# Patient Record
Sex: MALE
[De-identification: names, ages, dates, MRNs, and addresses within clinical notes are randomized per-mention and may not be internally consistent; named-entity substitution may affect disease eponyms.]

---

## 2020-06-26 LAB
BUN BLD-MCNC: 20 MG/DL (ref 7–18)
GLUCOSE SERPLBLD-MCNC: 114 MG/DL (ref 74–106)
HCT VFR BLD CALC: 46.6 % (ref 39.6–49)
INR BLD: 0.92
LYMPHOCYTES # SPEC AUTO: 2.8 K/UL (ref 0.7–4.9)
PMV BLD: 9 FL (ref 7.6–11.3)
POTASSIUM SERPL-SCNC: 3.9 MMOL/L (ref 3.5–5.1)
RBC # BLD: 5.43 M/UL (ref 4.33–5.43)

## 2020-06-26 NOTE — RAD REPORT
EXAM DESCRIPTION:  Griselda Aranda And Aditya (2 Views)6/26/2020 2:08 pm

 

CLINICAL HISTORY:  Preop for cardiac catheterization

 

COMPARISON:  None

 

FINDINGS:   The lungs appear clear of acute infiltrate. The heart is normal size

 

Postsurgical changes involve the chest.

 

IMPRESSION:   No acute abnormalities displayed

## 2020-06-29 ENCOUNTER — HOSPITAL ENCOUNTER (OUTPATIENT)
Dept: HOSPITAL 97 - CCL | Age: 55
Discharge: HOME | End: 2020-06-29
Attending: INTERNAL MEDICINE
Payer: COMMERCIAL

## 2020-06-29 VITALS — DIASTOLIC BLOOD PRESSURE: 73 MMHG | SYSTOLIC BLOOD PRESSURE: 122 MMHG

## 2020-06-29 VITALS — OXYGEN SATURATION: 99 %

## 2020-06-29 VITALS — TEMPERATURE: 97.2 F

## 2020-06-29 DIAGNOSIS — E78.5: ICD-10-CM

## 2020-06-29 DIAGNOSIS — Z95.1: ICD-10-CM

## 2020-06-29 DIAGNOSIS — I25.82: ICD-10-CM

## 2020-06-29 DIAGNOSIS — I25.110: Primary | ICD-10-CM

## 2020-06-29 DIAGNOSIS — I10: ICD-10-CM

## 2020-06-29 DIAGNOSIS — Z11.59: ICD-10-CM

## 2020-06-29 DIAGNOSIS — Z87.891: ICD-10-CM

## 2020-06-29 DIAGNOSIS — Z79.82: ICD-10-CM

## 2020-06-29 DIAGNOSIS — E11.9: ICD-10-CM

## 2020-06-29 PROCEDURE — 36415 COLL VENOUS BLD VENIPUNCTURE: CPT

## 2020-06-29 PROCEDURE — 85610 PROTHROMBIN TIME: CPT

## 2020-06-29 PROCEDURE — 71046 X-RAY EXAM CHEST 2 VIEWS: CPT

## 2020-06-29 PROCEDURE — 80048 BASIC METABOLIC PNL TOTAL CA: CPT

## 2020-06-29 PROCEDURE — 93458 L HRT ARTERY/VENTRICLE ANGIO: CPT

## 2020-06-29 PROCEDURE — 93459 L HRT ART/GRFT ANGIO: CPT

## 2020-06-29 PROCEDURE — 85025 COMPLETE CBC W/AUTO DIFF WBC: CPT

## 2020-06-29 PROCEDURE — 82947 ASSAY GLUCOSE BLOOD QUANT: CPT

## 2020-06-29 PROCEDURE — 85730 THROMBOPLASTIN TIME PARTIAL: CPT

## 2020-06-29 NOTE — XMS REPORT
Continuity of Care Document

                            Created on:2020



Patient:DAYDAY PETER

Sex:Male

:1965

External Reference #:[id number]





Demographics







                          Address                   [address] [place] [address]

 

                          Home Phone                [phone]

 

                          Work Phone                [phone]

 

                          Email Address             [email or web address]

 

                          Preferred Language        English

 

                          Marital Status            Unknown

 

                          Christian Affiliation     Unknown

 

                          Race                      Unknown

 

                          Additional Race(s)        Unavailable

 

                          Ethnic Group              Unknown









Author







                          Organization              [facility]

t

 

                          Address                   [address] [place] [address] [address]



                                                    [place] [address]

 

                          Phone                     [phone]









Support







                Name            Relationship    Address         Phone

 

                ROME        Unavailable     [address]    [phone]



                                                [place] [address] 

 

                ROME        Unavailable     [address] [phone]



                                                [place] [address] 









Care Team Providers







                    Name                Role                Phone

 

                    Unavailable         Unavailable         Unavailable









Payers







           Payer Name Policy Type Policy Number Effective Date Expiration Date S

ource







Problems

This patient has no known problems.



Allergies, Adverse Reactions, Alerts







       Allergy Allergy Status Severity Reaction(s) Onset  Inactive Treating Comm

ents 

Source



       Name   Type                        Date   Date   Clinician        

 

       No Known DA     Active U             2011                      [facility]



       Allergie                                                     Clear



       s                                  00:00:                      [address]







Medications

This patient has no known medications.



Procedures

This patient has no known procedures.



Results







           Test Description Test Time  Test Comments Results    Result Comments 

Source

 

           SURG       2019            ---------------------            



                      12:56:00              ---------------------            



                                            ---------------------            



                                            ---------------------            



                                            --------RUN DATE:            



                                            19              



                                            [facility] - LAB *LIVE*            



                                                        PAGE 1            



                                            RUN TIME: 9966            



                                                                  



                                            Specimen Inquiry            



                                                          RUN            



                                            USER: INTERFACE            



                                                                  



                                                                  



                                                                  



                                            ---------------------            



                                            ---------------------            



                                            ---------------------            



                                            ---------------------            



                                            --------PATIENT:            



                                            DAYDAY PETER            



                                                    ACCT #:            



                                            [id number] LOC:            



                                            L.2S       U #:            



                                            [id number]            



                                                                  



                                                 AGE/SX: 54/M            



                                                ROOM: [facility]            



                                            RE19REG DR:            



                                            Clark Tipton MD            



                                                    :            



                                            61     BED:  1            



                                                    DIS: 19            



                                                                  



                                                                  



                                            STATUS: DIS Александр            



                                            TLOC:                 



                                            ---------------------            



                                            ---------------------            



                                            ---------------------            



                                            ---------------------            



                                            -------- SPEC #:            



                                            PMC:[id number]            



                                            RECD:             



                                              STATUS:  BEBO            



                                                REQ #: [id number]            



                                                                  



                                              MOY:             



                                                [facility] DR:            



                                            Clark Tipton MD            



                                                     ENTERED:            



                                                SP            



                                            TYPE: SURG            



                                            OTHR DR: No Primary            



                                            or Family Physician            



                                                                  



                                                                  



                                                         Self            



                                            Referred              



                                                                  



                                                                  



                                              Issac Bailon MDORDERED:  SURG PATH            



                                            LVL 3                 



                                                                  



                                                                  



                                                   COPIES TO:            



                                            No Primary or Family            



                                            Physician    Self            



                                            Referred              



                                            Clark Tipton MD            



                                             [address] [address]   [address]            



                                            [place] [address] [phone]            



                                            [email or web address]            



                                                Issac Bailon MD            



                                              [address]            



                                            [address]   [place] [address]              



                                            [phone]            



                                            HISTOLOGY:   TISSUE            



                                                   ID    BLK            



                                            PCS ABIGAIL LEV PROCEDURE            



                                                  DISPOSITION            



                                            _______________ ____            



                                            ______ ___ ___ ___            



                                            _______________            



                                            ___________            



                                            GALLBLADDER, NO A            



                                            1-2             1            



                                                                  



                                            PROCEDURES: SURG PATH            



                                            LVL 3 ()            



                                            TISSUES:           A.            



                                            GALLBLADDER, NOS -            



                                            GALLBLADDER            



                                            CLINICAL HISTORY            



                                            ABD PAIN              



                                            CPT CODES    CPT            



                                            CODE(S): 64087      ,            



                                                      ,            



                                             ,           ,            



                                               ,            ,            



                                                                  



                                                      **            



                                            CONTINUED ON NEXT            



                                            PAGE **               



                                            ---------------------            



                                            ---------------------            



                                            ---------------------            



                                            ---------------------            



                                            --------RUN DATE:            



                                            19              



                                            [facility] - LAB *LIVE*            



                                                        PAGE 2            



                                            RUN TIME: 1256            



                                                                  



                                            Specimen Inquiry            



                                                          RUN            



                                            USER: INTERFACE            



                                                                  



                                                                  



                                                                  



                                            ---------------------            



                                            ---------------------            



                                            ---------------------            



                                            ---------------------            



                                            --------SPEC #:            



                                            [facility]:[id number]            



                                            PATIENT:              



                                            DAYDAY PETER            



                                                                  



                                            #[id number]            



                                            (Continued)----------            



                                            ---------------------            



                                            ---------------------            



                                            ---------------------            



                                            -------------------            



                                                   FINAL            



                                            DIAGNOSIS             



                                            Gallbladder,            



                                            laparoscopic            



                                            cholecystectomy:            



                                             MILD CHRONIC            



                                            CHOLECYSTITIS WITH            



                                            CHOLELITHIASIS            



                                            GROSS DESCRIPTION            



                                            Gallbladder.            



                                            Received in formalin            



                                            is a collapsed            



                                            gallbladder, 8.5 x            



                                            2.7 x 2.0   cm with a            



                                            wall, 0.3 cm in            



                                            average thickness and            



                                            a defect in the body            



                                            extending   to the            



                                            gallbladder neck, 3.0            



                                            cm.  The mucosa is            



                                            brown-green, smooth            



                                            and   velvety.  The            



                                            lumen contains scanty            



                                            bile and numerous            



                                            black friable            



                                            calculi,   2.0 x 1.7            



                                            x 0.3 cm in            



                                            aggregate.  Five            



                                            additional similar            



                                            calculi are present            



                                            in the container            



                                            measuring 1.0 x 0.7 x            



                                            0.3 cm in aggregate.            



                                            Representative            



                                            section submitted as            



                                            A.  /ba/pdb            



                                            Grossing performed at            



                                            [facility], [address]            



                                            [facility], [address],            



                                            [place] [address].            



                                             Medical Director:            



                                            Champ Dooley M.D.            



                                               MICROSCOPIC            



                                            DESCRIPTION            



                                            Gallbladder.            



                                            Sections demonstrate            



                                            gallbladder wall with            



                                            glandular mucosa.            



                                            Focal dilated            



                                            Rokitansky-Aschoff            



                                            sinuses are            



                                            identified.  There is            



                                            mild patchy   chronic            



                                            inflammation in the            



                                            wall of the            



                                            gallbladder.  There            



                                            is no evidence of            



                                            dysplasia or            



                                            malignancy.            



                                            /cm------------------            



                                            ---------------------            



                                            ---------------------            



                                            ---------------------            



                                            ----------- Signed            



                                            SIGNATURE ON FILE            



                                                                  



                                            Marry Freeman            



                                            19 1256            



                                            ---------------------            



                                            ---------------------            



                                            ---------------------            



                                            ---------------------            



                                            --------              



                                                                  



                                                               **            



                                            END OF REPORT **            









                    COMPREHENSIVE METABOLIC PANEL 2019 18:11:00 









                      Test Item  Value      Reference Range Interpretation Comme

nts









             SODIUM (test code = NA) 137 mmol/L   134-147      N            

 

             POTASSIUM (test code = K) 4.4 mmol/L   3.4-5.0      N            

 

             CHLORIDE (test code = CL) 107 mmol/L   100-108      N            

 

             CARBON DIOXIDE (test code = CO2) 23 mmol/L    21-32        N       

     

 

             ANION GAP (test code = GAP) 7.0 GAP calc 4.0-15.0     N            

 

             GLUCOSE (test code = GLU) 239 MG/DL           H            

 

             BLOOD UREA NITROGEN (test code = BUN) 10 MG/DL     7-18         N  

          

 

             GLOMERULAR FILTRATION RATE (test code = GFR) >=60 max estimate estG

FR >60                       

 

             CREATININE (test code = CREAT) 1.3 MG/DL    0.8-1.3      N         

   

 

             TOTAL PROTEIN (test code = PROT) 7.1 G/DL     6.4-8.2      N       

     

 

             ALBUMIN (test code = ALB) 3.2 G/DL     3.4-5.0      L            

 

             GLOBULIN (test code = GLOB) 3.9 GM/dL                              

 

             ALBUMIN/GLOBULIN RATIO (test code = A/G) 0.8 RATIO    1.2-2.2      

L            

 

             CALCIUM (test code = CA) 7.9 MG/DL    8.5-10.1     L            

 

             BILIRUBIN TOTAL (test code = BILT) 1.20 MG/DL   0.2-1.2      N     

       

 

             SGOT/AST (test code = AST) 91 Unit/L    15-37        H            

 

             SGPT/ALT (test code = ALT) 139 Unit/L   12-78        H            

 

             ALKALINE PHOSPHATASE TOTAL (test code = ALKP) 81 Unit/L      

     N            



GLUCOSE BEDSIDE [id number] 16:44:00





             Test Item    Value        Reference Range Interpretation Comments

 

             GLUCOSE BEDSIDE TESTING (test code 147 mg/dL           H     

       



             = GLUBED)                                           



CBC W/AUTO [id number] 16:07:00





             Test Item    Value        Reference Range Interpretation Comments

 

             WHITE BLOOD CELL (test code = 8.3 K/mm3    3.5-11.0     N          

  



             WBC)                                                

 

             RED BLOOD CELL (test code = RBC) 4.92 M/mm3   4.70-6.10    N       

     

 

             HEMOGLOBIN (test code = HGB) 14.0 G/DL    12.3-15.9    N           

 

 

             HEMATOCRIT (test code = HCT) 41.5 %       35.8-46.7    N           

 

 

             MEAN CELL VOLUME (test code = 84.3 Fl      86.3-98.9    L          

  



             MCV)                                                

 

             MEAN CELL HGB (test code = MCH) 28.5 pg      28.9-34.4    L        

    

 

             MEAN CELL HGB CONCETRATION (test 33.7 G/DL    32.1-34.5    N       

     



             code = MCHC)                                        

 

             RED CELL DISTRIBUTION WIDTH (test 13.2 SD      11.5-14.5    N      

      



             code = RDW)                                         

 

             PLATELET COUNT (test code = PLT) 253.0 K/mm3  150-450      N       

     

 

             MEAN PLATELET VOLUME (test code = 10.30 fL     7.0-9.6      H      

      



             MPV)                                                

 

             NEUTROPHIL % (test code = NT%) 67.6 %       40-76                  

   

 

             LYMPHOCYTE % (test code = LY%) 21.1 %       20.5-51.1    N         

   

 

             MONOCYTE % (test code = MO%) 8.0 %        1.7-9.3      N           

 

 

             EOSINOPHIL % (test code = EO%) 2.7 %        0.0-6.0      N         

   

 

             BASOPHIL % (test code = BA%) 0.6 %        0.0-2.0      N           

 

 

             NEUTROPHIL # (test code = NT#) 5.59 K/mm3   1.8-7.6      N         

   

 

             LYMPHOCYTE # (test code = LY#) 1.7 K/mm3    0.6-3.0      N         

   

 

             MONOCYTE # (test code = MO#) 0.7 K/mm3    0.2-1.5      N           

 

 

             EOSINOPHIL # (test code = EO#) 0.2 K/mm3    0.0-0.4      N         

   

 

             BASOPHIL # (test code = BA#) 0.1 K/mm3    0.0-0.2      N           

 

 

             MANUAL DIFF REQUIRED (test code = NO DIFF/SCN  CRITERIA            

      



             MDIFF)                                              



GLUCOSE BEDSIDE [id number] 13:00:00





             Test Item    Value        Reference Range Interpretation Comments

 

             GLUCOSE BEDSIDE TESTING (test code 143 mg/dL           H     

       



             = GLUBED)                                           



GLUCOSE BEDSIDE [id number] 10:06:00





             Test Item    Value        Reference Range Interpretation Comments

 

             GLUCOSE BEDSIDE TESTING (test code 113 mg/dL           H     

       



             = GLUBED)                                           



GLUCOSE BEDSIDE [id number] 07:51:00





             Test Item    Value        Reference Range Interpretation Comments

 

             GLUCOSE BEDSIDE TESTING (test code 131 mg/dL           H     

       



             = GLUBED)                                           



COMPREHENSIVE METABOLIC [id number] 07:11:00





             Test Item    Value        Reference Range Interpretation Comments

 

             SODIUM (test code = NA) 141 mmol/L   134-147      N            

 

             POTASSIUM (test code = 3.9 mmol/L   3.4-5.0      N            



             K)                                                  

 

             CHLORIDE (test code = 110 mmol/L   100-108      H            



             CL)                                                 

 

             CARBON DIOXIDE (test 25 mmol/L    21-32        N            



             code = CO2)                                         

 

             ANION GAP (test code = 6.0 GAP calc 4.0-15.0     N            



             GAP)                                                

 

             GLUCOSE (test code = 123 MG/DL           H            



             GLU)                                                

 

             BLOOD UREA NITROGEN 10 MG/DL     7-18         N            



             (test code = BUN)                                        

 

             GLOMERULAR FILTRATION >=60 max estimate >60                       



             RATE (test code = GFR) estGFR                                 

 

             CREATININE (test code = 1.3 MG/DL    0.8-1.3      N            



             CREAT)                                              

 

             TOTAL PROTEIN (test code 7.3 G/DL     6.4-8.2      N            



             = PROT)                                             

 

             ALBUMIN (test code = 3.5 G/DL     3.4-5.0      N            



             ALB)                                                

 

             GLOBULIN (test code = 3.8 GM/dL                              



             GLOB)                                               

 

             ALBUMIN/GLOBULIN RATIO 0.9 RATIO    1.2-2.2      L            



             (test code = A/G)                                        

 

             CALCIUM (test code = CA) 8.3 MG/DL    8.5-10.1     L            

 

             BILIRUBIN TOTAL (test 1.40 MG/DL   0.2-1.2      H            



             code = BILT)                                        

 

             SGOT/AST (test code = 102 Unit/L   15-37        H            



             AST)                                                

 

             SGPT/ALT (test code = 151 Unit/L   12-78        H            



             ALT)                                                

 

             ALKALINE PHOSPHATASE 99 Unit/L           N            



             TOTAL (test code = ALKP)                                        



COMPREHENSIVE METABOLIC [id number] 06:59:00





             Test Item    Value        Reference Range Interpretation Comments

 

             SODIUM (test code = NA) 141 mmol/L   134-147      N            

 

             POTASSIUM (test code = K) 3.9 mmol/L   3.4-5.0      N            

 

             CHLORIDE (test code = CL) 110 mmol/L   100-108      H            

 

             CARBON DIOXIDE (test code = CO2) 25 mmol/L    21-32        N       

     

 

             ANION GAP (test code = GAP) 6.0 GAP calc 4.0-15.0     N            

 

             GLUCOSE (test code = GLU) 123 MG/DL           H            

 

             BLOOD UREA NITROGEN (test code = 10 MG/DL     7-18         N       

     



             BUN)                                                

 

             GLOMERULAR FILTRATION RATE (test  estGFR      >60                  

     



             code = GFR)                                         

 

             CREATININE (test code = CREAT)  MG/DL       0.8-1.3                

   

 

             TOTAL PROTEIN (test code = PROT)  G/DL        6.4-8.2              

     

 

             ALBUMIN (test code = ALB)  G/DL        3.4-5.0                   

 

             GLOBULIN (test code = GLOB)  GM/dL                                 

 

             ALBUMIN/GLOBULIN RATIO (test  RATIO       1.2-2.2                  

 



             code = A/G)                                         

 

             CALCIUM (test code = CA) 8.3 MG/DL    8.5-10.1     L            

 

             BILIRUBIN TOTAL (test code =  MG/DL       0.2-1.2                  

 



             BILT)                                               

 

             SGOT/AST (test code = AST)  Unit/L      15-37                     

 

             SGPT/ALT (test code = ALT)  Unit/L      12-78                     

 

             ALKALINE PHOSPHATASE TOTAL (test  Unit/L                     

     



             code = ALKP)                                        



CBC W/AUTO [id number] 06:53:00





             Test Item    Value        Reference Range Interpretation Comments

 

             WHITE BLOOD CELL (test code = 8.3 K/mm3    3.5-11.0     N          

  



             WBC)                                                

 

             RED BLOOD CELL (test code = RBC) 4.92 M/mm3   4.70-6.10    N       

     

 

             HEMOGLOBIN (test code = HGB) 14.0 G/DL    12.3-15.9    N           

 

 

             HEMATOCRIT (test code = HCT) 41.5 %       35.8-46.7    N           

 

 

             MEAN CELL VOLUME (test code = 84.3 Fl      86.3-98.9    L          

  



             MCV)                                                

 

             MEAN CELL HGB (test code = MCH) 28.5 pg      28.9-34.4    L        

    

 

             MEAN CELL HGB CONCETRATION (test 33.7 G/DL    32.1-34.5    N       

     



             code = MCHC)                                        

 

             RED CELL DISTRIBUTION WIDTH (test 13.2 SD      11.5-14.5    N      

      



             code = RDW)                                         

 

             PLATELET COUNT (test code = PLT) 253.0 K/mm3  150-450      N       

     

 

             MEAN PLATELET VOLUME (test code = 10.30 fL     7.0-9.6      H      

      



             MPV)                                                

 

             NEUTROPHIL % (test code = NT%) 67.6 %       40-76                  

   

 

             LYMPHOCYTE % (test code = LY%) 21.1 %       20.5-51.1    N         

   

 

             MONOCYTE % (test code = MO%) 8.0 %        1.7-9.3      N           

 

 

             EOSINOPHIL % (test code = EO%) 2.7 %        0.0-6.0      N         

   

 

             BASOPHIL % (test code = BA%) 0.6 %        0.0-2.0      N           

 

 

             NEUTROPHIL # (test code = NT#) 5.59 K/mm3   1.8-7.6      N         

   

 

             LYMPHOCYTE # (test code = LY#) 1.7 K/mm3    0.6-3.0      N         

   

 

             MONOCYTE # (test code = MO#) 0.7 K/mm3    0.2-1.5      N           

 

 

             EOSINOPHIL # (test code = EO#) 0.2 K/mm3    0.0-0.4      N         

   

 

             BASOPHIL # (test code = BA#) 0.1 K/mm3    0.0-0.2      N           

 

 

             MANUAL DIFF REQUIRED (test code =  DIFF/SCN    CRITERIA            

      



             MDIFF)                                              



- XR CHEST 1 -92-30 23:12:00 Name: DAYDAY PETER                [facility]                      : 1965 Age/S: 54  / M         [id number] 
[employer] [place]              Unit #: [id number]     Loc:               [place]
[address]                Phys: Clark Tipton MD                                 
              Acct: [id number]  Dis Date:               Status: ADM IN        
                         PHONE #: [phone]     Exam Date: 2019  2300  
                  FAX #:                  Reason: FOR SURGERY IN AM.            
                    EXAMS:                                               CPT:   
     [id number] XR CHEST 1 V                               [id number]             
Fluoro Time:   DAP (Gy m2):          Air Kerma (mGy):                      
HISTORY: Chest pain.       Location: C3        COMPARISON:2012              
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       There
is mild cardiomegaly.  No vascular congestion.        The lungs are clear of f
ocal consolidation. No effusion,        pneumothorax, or acute osseous 
abnormality.       IMPRESSION:                   1. Heart size is upper normal. 
No focal consolidation.           ** Electronically Signed by TIMOTEO Reyes **            **             on 2019 at 2312            **           
          Reported and signed by: Jaison Reyes M.D.              CC: Clark Tipton MD                                                      PAGE  1         
             Signed Report                             Name: DAYDAY PETER Denair    : 1965 Age/S: 54  / M         [id number] 
[address] [place]              Unit #: [id number]     Loc:               [place]
[address]                Phys: Clark Tipton MD                       Acct: 
[id number]  Dis Date:               Status: ADM IN              PHONE #: 
[phone]     Exam Date: 2019  2300                     FAX #:         
 Reason: FOR SURGERY IN AM.                                 EXAMS:              
       CPT:           [id number] XR CHEST 1 V                               [address]
      Fluoro Time:            DAP (Gy m2):          Air Kerma (mGy):         
&lt;Continued&gt; Technologist: Kelli Beck RT(R)(CT)                      
      Trnscb Date/Time: 2019 (2312) t.SDR.RXC2                       Orig 
Print D/T: S: 2019 (2315)            PAGE  2                       Signed 
Report- CT ABD PELVIS [id number] 19:50:00  Name: DAYDAY PETER Denair                      : 1965 Age/S: 54  / M       
 [id number] [address] [place]              Unit #: [id number]     Loc:               
[place] [address]                Phys: Clark Tipton MD                    
                           Acct: [id number]  Dis Date:               Status: 
ADM IN                                  PHONE #: [phone]     Exam Date: 
2019                     FAX #:                   Reason: Abd pain  
                                         EXAMS:                                 
             CPT:           [id number] CT ABD PELVIS W/CONT                      
[address]                    CT ABDOMEN AND PELVIS WITH IV CONTRAST               
HISTORY:  Abd pain                COMPARISON: MRI abdomen 12.               
TECHNIQUE: Axial CT images of the abdomen and pelvis were obtained       with 
coronal and/or sagittal reformatted views.  Automated exposure       control, 
iterative reconstruction technique, and/or adjustment of mA       and/or kV 
according to patient's size was utilized for radiation dose       reduction.    
    IV CONTRAST:  100 ml Isovue-300.       PO CONTRAST: None.       Location: 
B2.               FINDINGS:               Mild atelectasis present in both lung 
bases.  The heart size is       normal.  Coronary artery calcifications.  
Epicardial pacemaker leads.        Sternotomy wires.               The 
gallbladder is distended.  There are few small gallstones in theneck of the 
gallbladder.  There may be a small amount of       pericholecystic fluid and 
minimal inflammatory changes.  Mild diffuse       low attenuation seen 
throughout the liver suggestive of cyst    steatosis.  No focal hepatic lesion. 
Spleen, pancreas and adrenal       glands are unremarkable.               Both 
kidneys are similar in size, shape and enhancement without       evidence of 
hydronephrosis.  There are at least 3 nonobstructing right       renal stones 
measuring up to 3 mm.  No definite left renal stone.               Urinary 
bladder is partially distended without wall thickening.  The       prostate is 
normal in size.               No free air, free fluid or evidence of a bowel 
obstruction.  Normal       appendix.  No bowel wall thickening.               
The aorta is normal in caliber with mild to moderate atherosclerotic       
disease.  No abdominal or pelvic adenopathy.            Mild lumbar spondylosis.
                IMPRESSION:               PAGE  1        Signed Report          
         (CONTINUED)   Name: NIESHADORIS ESPARZADEANA DERICK               [facility]    
                 : 1965 Age/S: 54  / M         [id number] [employer] [place]      
       Unit #: [id number]     Loc:               [place] [address]             
  Phys: Clark Tipton MD                                                
Acct: [id number]  Dis Date:               Status: ADM IN                      
           PHONE #: [phone]     Exam Date: 2019            FAX 
#:                   Reason: Abd pain                                           
EXAMS:                                               CPT:           [id number] CT
ABD PELVIS W/CONT                [address]               &lt;Continued&gt;          
Cholelithiasis.  Minimal inflammatorychanges and probable         
pericholecystic fluid concerning for acute cholecystitis.  A right   upper 
quadrant ultrasound may be helpful for further assessment.                   
Normal appendix.                   Multiple nonobstructing right renal stones.  
               ** Electronically Signed by TIMOTEO Angelo **          
**               on 2019 at 1950              **                   
Reported and signed by: Darshan Angelo M.D.                               
CC: Clark Tipton MD                                    Technologist:Edmundo Nava, RT(R)(CT); ..  CTDI:        DLP:     Trnscb Date/Time: 2019 
() t.SDR.SP17                       Orig Print D/T: S: 2019 ()    
PAGE  2                       Signed ReportLACTIC [id number] 18:23:00





             Test Item    Value        Reference Range Interpretation Comments

 

             LACTIC ACID (test code = LACT) 1.4 mmol/L   0.4-2.0      N         

   



COMPREHENSIVE METABOLIC [id number] 18:09:00





             Test Item    Value        Reference Range Interpretation Comments

 

             SODIUM (test code = NA) 141 mmol/L   134-147      N            

 

             POTASSIUM (test code = 3.9 mmol/L   3.4-5.0      N            



             K)                                                  

 

             CHLORIDE (test code = 110 mmol/L   100-108      H            



             CL)                                                 

 

             CARBON DIOXIDE (test 26 mmol/L    21-32        N            



             code = CO2)                                         

 

             ANION GAP (test code = 5.0 GAP calc 4.0-15.0     N            



             GAP)                                                

 

             GLUCOSE (test code = 116 MG/DL           H            



             GLU)                                                

 

             BLOOD UREA NITROGEN 13 MG/DL     7-18         N            



             (test code = BUN)                                        

 

             GLOMERULAR FILTRATION >=60 max estimate >60                       



             RATE (test code = GFR) estGFR                                 

 

             CREATININE (test code = 1.3 MG/DL    0.8-1.3      N            



             CREAT)                                              

 

             TOTAL PROTEIN (test code 7.4 G/DL     6.4-8.2      N            



             = PROT)                                             

 

             ALBUMIN (test code = 3.6 G/DL     3.4-5.0      N            



             ALB)                                                

 

             GLOBULIN (test code = 3.8 GM/dL                              



             GLOB)                                               

 

             ALBUMIN/GLOBULIN RATIO 1.0 RATIO    1.2-2.2      L            



             (test code = A/G)                                        

 

             CALCIUM (test code = CA) 8.4 MG/DL    8.5-10.1     L            

 

             BILIRUBIN TOTAL (test 1.70 MG/DL   0.2-1.2      H            



             code = BILT)                                        

 

             SGOT/AST (test code = 130 Unit/L   15-37        H            



             AST)                                                

 

             SGPT/ALT (test code = 102 Unit/L   12-78        H            



             ALT)                                                

 

             ALKALINE PHOSPHATASE 76 Unit/L           N            



             TOTAL (test code = ALKP)                                        



LIPID PROFILE (CORONARY RISK)2019 18:09:00





             Test Item    Value        Reference Range Interpretation Comments

 

             TRIGLYCERIDES (test code = TRIG) 93 MG/DL     0-150        N       

     

 

             CHOLESTEROL (test code = CHOL) 140 MG/DL    133-200      N         

   

 

             CHOLESTEROL/HDL RATIO (test code = 2.50 RATIO   >0                 

       



             CHOLHDL)                                            

 

             HDL CHOLESTEROL (test code = HDL) 56 MG/DL     40-59        N      

      

 

             NON-HDL CHOLESTEROL (test code = 84 mg/dL     <130                 

     



             NHDL)                                               

 

             LIPOPROTEIN LDL (test code = LDL) 69 MG/DL     0-129        N      

      

 

             LDL/HDL (test code = LDL/HDL) 1.23 Ratio   1.48-3.22 Avg L         

   



[id number] 18:09:00





             Test Item    Value        Reference Range Interpretation Comments

 

             PHOSPHOROUS (test code = PHOS) 2.5 MG/DL    2.5-4.9      N         

   



RULE OUT MI [id number] 18:09:00





             Test Item    Value        Reference Range Interpretation Comments

 

             CREATINE KINASE 179 Unit/L          N            



             (CK) (test code =                                        



             CK)                                                 

 

             TROPONIN-I (test < 0.015 NG/ML 0.000-0.045  N            Negative: 

</= 0.045



             code = TROPI)                                        Positive: >/= 

0.046



                                                                 Correlation wit

h



                                                                 serial results,

 other



                                                                 cardiac markers

, and



                                                                 clinical findin

gs is



                                                                 necessary to de

termine



                                                                 the clinical



                                                                 significance of

 this



                                                                 result. Quantit

ative



                                                                 results using



                                                                 different



                                                                 methodologies s

hould



                                                                 not be compared

 to one



                                                                 another as nume

rical



                                                                 results may promise

yby



                                                                 method.



[id number] 18:09:00





             Test Item    Value        Reference Range Interpretation Comments

 

             LIPASE (test code = LIP) 457 Unit/L   114-286      H            



[id number] 18:09:00





             Test Item    Value        Reference Range Interpretation Comments

 

             MAGNESIUM (test code = MAG) 2.0 MG/DL    1.8-2.4      N            



COMPREHENSIVE METABOLIC [id number] 18:05:00





             Test Item    Value        Reference Range Interpretation Comments

 

             SODIUM (test code = NA) 141 mmol/L   134-147      N            

 

             POTASSIUM (test code = K) 3.9 mmol/L   3.4-5.0      N            

 

             CHLORIDE (test code = CL) 110 mmol/L   100-108      H            

 

             CARBON DIOXIDE (test code = CO2) 26 mmol/L    21-32        N       

     

 

             ANION GAP (test code = GAP) 5.0 GAP calc 4.0-15.0     N            

 

             GLUCOSE (test code = GLU) 116 MG/DL           H            

 

             BLOOD UREA NITROGEN (test code = 13 MG/DL     7-18         N       

     



             BUN)                                                

 

             GLOMERULAR FILTRATION RATE (test  estGFR      >60                  

     



             code = GFR)                                         

 

             CREATININE (test code = CREAT)  MG/DL       0.8-1.3                

   

 

             TOTAL PROTEIN (test code = PROT)  G/DL        6.4-8.2              

     

 

             ALBUMIN (test code = ALB)  G/DL        3.4-5.0                   

 

             GLOBULIN (test code = GLOB)  GM/dL                                 

 

             ALBUMIN/GLOBULIN RATIO (test  RATIO       1.2-2.2                  

 



             code = A/G)                                         

 

             CALCIUM (test code = CA) 8.4 MG/DL    8.5-10.1     L            

 

             BILIRUBIN TOTAL (test code =  MG/DL       0.2-1.2                  

 



             BILT)                                               

 

             SGOT/AST (test code = AST)  Unit/L      15-37                     

 

             SGPT/ALT (test code = ALT)  Unit/L      12-78                     

 

             ALKALINE PHOSPHATASE TOTAL (test  Unit/L                     

     



             code = ALKP)                                        



LIPID PROFILE (CORONARY RISK)2019 18:05:00





             Test Item    Value        Reference Range Interpretation Comments

 

             TRIGLYCERIDES (test code = TRIG)  MG/DL       0-150                

     

 

             CHOLESTEROL (test code = CHOL)  MG/DL       133-200                

   

 

             CHOLESTEROL/HDL RATIO (test code =  RATIO       >0                 

       



             CHOLHDL)                                            

 

             HDL CHOLESTEROL (test code = HDL)  MG/DL       40-59               

      

 

             NON-HDL CHOLESTEROL (test code = NHDL)  mg/dL       <130           

           

 

             LIPOPROTEIN LDL (test code = LDL)  MG/DL       0-129               

      

 

             LDL/HDL (test code = LDL/HDL)  Ratio       1.48-3.22 Avg           

   



[id number] 18:05:00





             Test Item    Value        Reference Range Interpretation Comments

 

             PHOSPHOROUS (test code = PHOS)  MG/DL       2.5-4.9                

   



RULE OUT MI [id number] 18:05:00





             Test Item    Value        Reference Range Interpretation Comments

 

             CREATINE KINASE (CK) (test code = CK)  Unit/L                

          

 

             TROPONIN-I (test code = TROPI)  NG/ML       0.000-0.045            

   



[id number] 18:05:00





             Test Item    Value        Reference Range Interpretation Comments

 

             LIPASE (test code = LIP)  Unit/L      114-286                   



[id number] 18:05:00





             Test Item    Value        Reference Range Interpretation Comments

 

             MAGNESIUM (test code = MAG)  MG/DL       1.8-2.4                   



PROTHROMBIN [id number] 17:54:00





             Test Item    Value        Reference Range Interpretation Comments

 

             PT PATIENT (test code = PTP) 11.2 SECONDS 9.3-12.9     N           

 

 

             INTERNATIONAL NORMAL RATIO 0.97 INR Unit 0.8-1.2      N            



             (test code = INR)                                        



CBC W/AUTO [id number] 17:48:00





             Test Item    Value        Reference Range Interpretation Comments

 

             WHITE BLOOD CELL (test code = 9.0 K/mm3    3.5-11.0     N          

  



             WBC)                                                

 

             RED BLOOD CELL (test code = RBC) 5.10 M/mm3   4.70-6.10    N       

     

 

             HEMOGLOBIN (test code = HGB) 14.5 G/DL    12.3-15.9    N           

 

 

             HEMATOCRIT (test code = HCT) 43.4 %       35.8-46.7    N           

 

 

             MEAN CELL VOLUME (test code = 85.1 Fl      86.3-98.9    L          

  



             MCV)                                                

 

             MEAN CELL HGB (test code = MCH) 28.4 pg      28.9-34.4    L        

    

 

             MEAN CELL HGB CONCETRATION (test 33.4 G/DL    32.1-34.5    N       

     



             code = MCHC)                                        

 

             RED CELL DISTRIBUTION WIDTH (test 13.0 SD      11.5-14.5    N      

      



             code = RDW)                                         

 

             PLATELET COUNT (test code = PLT) 274.0 K/mm3  150-450      N       

     

 

             MEAN PLATELET VOLUME (test code = 10.50 fL     7.0-9.6      H      

      



             MPV)                                                

 

             NEUTROPHIL % (test code = NT%) 75.7 %       40-76        N         

   

 

             LYMPHOCYTE % (test code = LY%) 16.5 %       20.5-51.1    L         

   

 

             MONOCYTE % (test code = MO%) 6.9 %        1.7-9.3      N           

 

 

             EOSINOPHIL % (test code = EO%) 0.6 %        0.0-6.0      N         

   

 

             BASOPHIL % (test code = BA%) 0.3 %        0.0-2.0      N           

 

 

             NEUTROPHIL # (test code = NT#) 6.80 K/mm3   1.8-7.6      N         

   

 

             LYMPHOCYTE # (test code = LY#) 1.5 K/mm3    0.6-3.0      N         

   

 

             MONOCYTE # (test code = MO#) 0.6 K/mm3    0.2-1.5      N           

 

 

             EOSINOPHIL # (test code = EO#) 0.1 K/mm3    0.0-0.4      N         

   

 

             BASOPHIL # (test code = BA#) 0.0 K/mm3    0.0-0.2      N           

 

 

             MANUAL DIFF REQUIRED (test code = NO DIFF/SCN  CRITERIA            

      



             MDIFF)

## 2020-06-30 NOTE — OP
Date of Procedure:  06/29/2020



Surgeon:  MARY CAMPOS



Procedure Performed:  

1.Selective coronary angiogram.

2.Bypass graft study.

3.Left heart catheterization.



Access:  Right femoral artery 6-Australian closed with 6-Australian Angio-Seal.



Indication For The Procedure:  

1.Unstable angina.

2.Dyspnea on exertion.



Anesthesia:  Total sedation time was 35 minutes.



Description Of Procedure:  Patient was brought into the cardiac catheterization laboratory, prepped a
nd draped in the usual sterile fashion.  Then using incremental doses of fentanyl and Versed, adequat
e amount of sedation was achieved.  Then, we took a micropuncture sheath under the ultrasound guidanc
e and fluoroscopy guidance.  Right common femoral artery was accessed and then we exchanged to 6-Fren
ch pinnacle sheath and then we took a 6-Australian JL4 catheter into the aortic root over a J-wire, engag
ed left main, took the standard view pictures and then we took a 6-Australian JR4 catheter into the aorti
c root engaged the right coronary artery and the SVG grafts and the LIMA and then took all catheters 
and sheath out and closed the site with a 6-Australian Angio-Seal with good hemostasis.  No complications
.



Findings:  

1.Left main is moderate size without significant disease.

2.LAD has severe [id number]-[id number] disease with mid total occlusion.

3.Moderate-to-severe diffuse disease of D1 branch.

4.Patent LIMA to LAD.

5.Patent SVG to D3.

6.Severe proximal OM branch disease.

7.Patent SVG to the OM branch.

8.Severe diffuse left circumflex disease, but it is a very small vessel.

9.Diffuse mild-to-moderate distal LAD disease and its small vessel.

10.Severe proximal RCA with proximal total occlusion of the RCA.

11.Patent SVG to the RCA.

12.Patent LIMA to LAD.

13.Mild-to-moderate diffuse PDA disease distally.

14.LVEDP of 16 mmHg.  The LVEDP was measured by advancing the JR4 catheter over the J-wire into the 
LV.  LVEDP was recorded and then upon pullback there was no difference in pressure.



Conclusion:  

1.Severe native coronary artery disease as outlined above with severe diffuse bypassed arteries.

2.All 4 grafts are patent including SVG to D3, SVG to OM, SVG to distal RCA, and LIMA to LAD.



Recommendation:  

1.Aggressive medical management of coronary artery disease.

2.Discharge home once discharge criteria are met.





/NETTA

DD:  06/30/2020 13:39:01Voice ID:  [id number]

DT:  06/30/2020 22:43:20Report ID:  [id number]

## 2021-05-28 ENCOUNTER — HOSPITAL ENCOUNTER (EMERGENCY)
Dept: HOSPITAL 97 - ER | Age: 56
Discharge: HOME | End: 2021-05-28
Payer: SELF-PAY

## 2021-05-28 VITALS — TEMPERATURE: 98 F

## 2021-05-28 VITALS — DIASTOLIC BLOOD PRESSURE: 85 MMHG | SYSTOLIC BLOOD PRESSURE: 147 MMHG | OXYGEN SATURATION: 98 %

## 2021-05-28 DIAGNOSIS — N20.1: Primary | ICD-10-CM

## 2021-05-28 DIAGNOSIS — E78.5: ICD-10-CM

## 2021-05-28 DIAGNOSIS — Z95.1: ICD-10-CM

## 2021-05-28 DIAGNOSIS — I10: ICD-10-CM

## 2021-05-28 DIAGNOSIS — E11.9: ICD-10-CM

## 2021-05-28 LAB
ALBUMIN SERPL BCP-MCNC: 4 G/DL (ref 3.4–5)
ALP SERPL-CCNC: 63 U/L (ref 45–117)
ALT SERPL W P-5'-P-CCNC: 24 U/L (ref 12–78)
AST SERPL W P-5'-P-CCNC: 13 U/L (ref 15–37)
BUN BLD-MCNC: 27 MG/DL (ref 7–18)
GLUCOSE SERPLBLD-MCNC: 162 MG/DL (ref 74–106)
HCT VFR BLD CALC: 42.5 % (ref 39.6–49)
LIPASE SERPL-CCNC: 551 U/L (ref 73–393)
LYMPHOCYTES # SPEC AUTO: 2.2 K/UL (ref 0.7–4.9)
PMV BLD: 9.1 FL (ref 7.6–11.3)
POTASSIUM SERPL-SCNC: 4 MMOL/L (ref 3.5–5.1)
RBC # BLD: 5.12 M/UL (ref 4.33–5.43)

## 2021-05-28 PROCEDURE — 76377 3D RENDER W/INTRP POSTPROCES: CPT

## 2021-05-28 PROCEDURE — 96374 THER/PROPH/DIAG INJ IV PUSH: CPT

## 2021-05-28 PROCEDURE — 36415 COLL VENOUS BLD VENIPUNCTURE: CPT

## 2021-05-28 PROCEDURE — 81015 MICROSCOPIC EXAM OF URINE: CPT

## 2021-05-28 PROCEDURE — 80076 HEPATIC FUNCTION PANEL: CPT

## 2021-05-28 PROCEDURE — 85025 COMPLETE CBC W/AUTO DIFF WBC: CPT

## 2021-05-28 PROCEDURE — 96361 HYDRATE IV INFUSION ADD-ON: CPT

## 2021-05-28 PROCEDURE — 96375 TX/PRO/DX INJ NEW DRUG ADDON: CPT

## 2021-05-28 PROCEDURE — 83690 ASSAY OF LIPASE: CPT

## 2021-05-28 PROCEDURE — 99284 EMERGENCY DEPT VISIT MOD MDM: CPT

## 2021-05-28 PROCEDURE — 80048 BASIC METABOLIC PNL TOTAL CA: CPT

## 2021-05-28 PROCEDURE — 87086 URINE CULTURE/COLONY COUNT: CPT

## 2021-05-28 PROCEDURE — 74176 CT ABD & PELVIS W/O CONTRAST: CPT

## 2021-05-28 PROCEDURE — 81003 URINALYSIS AUTO W/O SCOPE: CPT

## 2021-05-28 PROCEDURE — 87088 URINE BACTERIA CULTURE: CPT

## 2021-05-28 NOTE — RAD REPORT
EXAM DESCRIPTION:

CT ABDOMEN AND PELVIS WITHOUT CONTRAST

 

CLINICAL HISTORY:  Abd pain;Flank pain

 

COMPARISON:  None Available.

 

TECHNIQUE:  CT of the abdomen and pelvis without IV contrast. Evaluation of the solid organs and vasc
ulature is suboptimal due to lack of IV contrast. This exam was performed according to our department
al dose-optimization program, which includes automated exposure control, adjustment of the mA and/or 
kV according to patient size and/or use of iterative reconstruction technique.

 

FINDINGS:  Lung Bases: The visualized   lung bases are clear.

Bones: Multilevel degenerative endplate spondylosis and facet arthropathy. Osteoarthritic change of t
he hips.

Abdomen:

Liver: The liver has normal size and density.

Gallbladder: Prior cholecystectomy.

Spleen, Pancreas, and Adrenal Glands:   The spleen, pancreas, and adrenal glands are unremarkable.

Kidneys: There is a 0.5 cm obstructing calculus in the distal right ureter producing mild right hydro
ureter and hydronephrosis. Bilateral nonobstructing nephrolithiasis. No left hydronephrosis.

Vasculature: Aortoiliac atherosclerosis. IVC is unremarkable.

Stomach:   The stomach and duodenum have normal course.

Other:   No free intraperitoneal air.   No free fluid or lymphadenopathy.

Pelvis:

Bladder:   Urinary bladder is unremarkable.

Bowel:   No dilated loops of large or small bowel.

Appendix:   Normal appendix.

Pelvis: Enlarged prostate.

 

IMPRESSION:  1.   There is a 0.5 cm obstructing calculus in the distal right ureter producing mild ri
ght hydroureter and hydronephrosis.

2.   Bilateral nonobstructing nephrolithiasis.

3.   Enlarged prostate.

 

Electronically signed by:   Matthew Denney   5/28/2021 5:41 AM CDT Workstation: [id number]

 

 

 

Due to temporary technical issues with the PACS/Fluency reporting system, reports are being signed by
 the in house radiologists without review as a courtesy to insure prompt reporting. The interpreting 
radiologist is fully responsible for the content of the report.

## 2021-05-28 NOTE — EDPHYS
Physician Documentation                                                                           

 [facility]                                                                 

Name: John Ott                                                                           

Age: 56 yrs                                                                                       

Sex: Male                                                                                         

: 1965                                                                                   

MRN: [id number]                                                                                   

Arrival Date: 2021                                                                          

Time: 03:18                                                                                       

Account#: [id number]                                                                            

Bed 3                                                                                             

Private MD:                                                                                       

ED Physician Isrrael Arenas                                                                      

HPI:                                                                                              

                                                                                             

06:26 This 56 yrs old  Male presents to ER via Ambulatory with complaints of Back     mh7 

      Pain.                                                                                       

06:27 The patient complains of pain in the right flank. The pain radiates to the abdomen.     mh7 

      Onset: The symptoms/episode began/occurred 3 day(s) ago.                                    

07:35 Modifying factors: The symptoms are alleviated by nothing. the symptoms are aggravated  mh7 

      by movement, palpation/percussion. Associated signs and symptoms: Pertinent positives:      

      nausea, Pertinent negatives: diarrhea, dizziness, dysuria, fever, urinary frequency,        

      headache, hematuria, pain radiating to the lower extremities, vomiting. Severity of         

      pain: At its worst the pain was moderate today, in the emergency department the pain is     

      unchanged.                                                                                  

                                                                                                  

Historical:                                                                                       

- Allergies:                                                                                      

04:04 No Known Allergies;                                                                     bb  

- Home Meds:                                                                                      

04:04 losartan oral oral [Active]; Metformin Oral [Active]; atorvastatin oral oral [Active];  bb  

      Allopurinol Oral [Active]; Metoprolol Tartrate Oral [Active]; Glipizide Oral [Active];      

- PMHx:                                                                                           

04:04 Hyperlipidemia; Hypertension; CAD; Diabetes - NIDDM; Gout;                              bb  

- PSHx:                                                                                           

04:04 CABG; Heart cath; Cholecystectomy;                                                      bb  

                                                                                                  

- Immunization history:: Adult Immunizations up to date.                                          

- Social history:: Smoking status: Patient denies any tobacco usage or history of.                

  Patient uses alcohol, occasionally. Patient/guardian denies using street drugs.                 

                                                                                                  

                                                                                                  

ROS:                                                                                              

07:35 Constitutional: Negative for fever, chills, and weight loss, Eyes: Negative for injury, mh7 

      pain, redness, and discharge, ENT: Negative for injury, pain, and discharge, Neck:          

      Negative for injury, pain, and swelling, Cardiovascular: Negative for chest pain,           

      palpitations, and edema, Respiratory: Negative for shortness of breath, cough,              

      wheezing, and pleuritic chest pain, : Negative for injury, bleeding, discharge, and       

      swelling, MS/Extremity: Negative for injury and deformity, Skin: Negative for injury,       

      rash, and discoloration, Neuro: Negative for headache, weakness, numbness, tingling,        

      and seizure, Psych: Negative for depression, anxiety, suicide ideation, homicidal           

      ideation, and hallucinations, Allergy/Immunology: Negative for hives, rash, and             

      allergies, Endocrine: Negative for neck swelling, polydipsia, polyuria, polyphagia, and     

      marked weight changes, Hematologic/Lymphatic: Negative for swollen nodes, abnormal          

      bleeding, and unusual bruising.                                                             

                                                                                                  

Exam:                                                                                             

07:35 Constitutional:  This is a well developed, well nourished patient who is awake, alert,  mh7 

      and in no acute distress. Head/Face:  Normocephalic, atraumatic. Eyes:  Pupils equal        

      round and reactive to light, extra-ocular motions intact.  Lids and lashes normal.          

      Conjunctiva and sclera are non-icteric and not injected.  Cornea within normal limits.      

      Periorbital areas with no swelling, redness, or edema. Neck:  Trachea midline, no           

      thyromegaly or masses palpated, and no cervical lymphadenopathy.  Supple, full range of     

      motion without nuchal rigidity, or vertebral point tenderness.  No Meningismus.             

      Chest/axilla:  Normal chest wall appearance and motion.  Nontender with no deformity.       

      No lesions are appreciated. Cardiovascular:  Regular rate and rhythm with a normal S1       

      and S2.  No gallops, murmurs, or rubs.  Normal PMI, no JVD.  No pulse deficits.             

      Respiratory:  Lungs have equal breath sounds bilaterally, clear to auscultation and         

      percussion.  No rales, rhonchi or wheezes noted.  No increased work of breathing, no        

      retractions or nasal flaring.                                                               

07:35 Skin:  Warm, dry with normal turgor.  Normal color with no rashes, no lesions, and no       

      evidence of cellulitis. MS/ Extremity:  Pulses equal, no cyanosis.  Neurovascular           

      intact.  Full, normal range of motion. Neuro:  Awake and alert, GCS 15, oriented to         

      person, place, time, and situation.  Cranial nerves II-XII grossly intact.  Motor           

      strength 5/5 in all extremities.  Sensory grossly intact.  Cerebellar exam normal.          

      Normal gait. Psych:  Awake, alert, with orientation to person, place and time.              

      Behavior, mood, and affect are within normal limits.                                        

07:35 Abdomen/GI: Inspection: abdomen appears normal, Bowel sounds: normal, in all quadrants,     

      Palpation: mild abdominal tenderness, in the right lower quadrant, mass, is not             

      appreciated, rebound tenderness, is not appreciated, voluntary guarding, is not             

      appreciated, involuntary guarding, is not appreciated, no appreciated organomegaly,         

      Rectal exam: the exam is deferred, because of patient request, Indicators: McBurney's       

      point is not tender, Pena's sign is negative, Rovsing's sign is negative, Obturator       

      sign is negative, Psoas sign is negative, Liver: no appreciated palpable abnormalities,     

      Hernia: not appreciated.                                                                    

07:35 Back: ROM is normal spinal alignment noted, CVA tenderness, that is mild, is noted on       

      the right, vertebral tenderness, is not appreciated, muscle spasm, is not present.          

                                                                                                  

Vital Signs:                                                                                      

03:57  / 106; Pulse 98; Resp 16 S; Temp 98(O); Pulse Ox 99% on R/A; Weight 81.65 kg     bb  

      (R); Height 5 ft. 6 in. (167.64 cm) (R); Pain 9/10;                                         

05:20  / 70; Pulse 89; Resp 17; Pulse Ox 99% ;                                          rr5 

06:00  / 95; Pulse 95; Resp 17; Pulse Ox 99% ;                                          rr5 

06:54  / 85; Pulse 90; Resp 19; Pulse Ox 98% ;                                          rr5 

03:57 Body Mass Index 29.05 (81.65 kg, 167.64 cm)                                             bb  

                                                                                                  

MDM:                                                                                              

06:27 Differential diagnosis: nephrolithiasis, pyelonephritis, UTI, diverticulitis. Data      [facility] 

      reviewed: vital signs, nurses notes, lab test result(s), CBC, electrolytes, urinalysis.     

      Data interpreted: Pulse oximetry: on room air is 99 %. Interpretation: normal.              

      Counseling: I had a detailed discussion with the patient and/or guardian regarding: the     

      historical points, exam findings, and any diagnostic results supporting the                 

      discharge/admit diagnosis, the presence of at least one elevated blood pressure reading     

      (>120/80) during this emergency department visit, lab results, radiology results, the       

      need for outpatient follow up, a urologist, to return to the emergency department if        

      symptoms worsen or persist or if there are any questions or concerns that arise at          

      home. Response to treatment: the patient's symptoms have resolved after treatment, the      

      patient's blood pressure is in an acceptable range, mental status has returned to           

      baseline, the patient no longer shows bradycardia, the patient is not short of breath,      

      the patient is not tachycardic, the patient's pain is gone, the patient's temperature       

      has normalized.                                                                             

06:29 Patient medically screened.                                                             [facility] 

                                                                                                  

                                                                                             

04:04 Order name: Urine Dipstick-Ancillary; Complete Time: 05:07                              [facility]

                                                                                             

04:09 Order name: Urine Microscopic Only; Complete Time: 05:07                                [facility] 

                                                                                             

04:09 Order name: Basic Metabolic Panel; Complete Time: 06:21                                 [facility] 

                                                                                             

04:09 Order name: CBC with Diff; Complete Time: 05:07                                         [facility] 

                                                                                             

04:09 Order name: Hepatic Function; Complete Time: 06:21                                      [facility] 

                                                                                             

04:09 Order name: Lipase; Complete Time: 06:21                                                [facility] 

                                                                                             

04:09 Order name: Urine Dipstick-Ancillary (obtain specimen); Complete Time: 04:09            [facility] 

                                                                                             

04:09 Order name: IV Saline Lock; Complete Time: 04:24                                        [facility] 

                                                                                             

04:09 Order name: Labs collected and sent; Complete Time: 04:24                               [facility] 

                                                                                             

04:55 Order name: Urine Culture                                                               [facility]

                                                                                             

05:07 Order name: CT Stone Protocol                                                           [facility] 

                                                                                                  

Administered Medications:                                                                         

05:10 Drug: Zofran (Ondansetron) 4 mg Route: IVP; Site: left antecubital;                     rr5 

06:00 Follow up: Response: No adverse reaction                                                rr5 

05:12 Drug: morphine 4 mg {Note: rass 0.} Route: IVP; Site: left antecubital;                 rr5 

06:00 Follow up: Response: No adverse reaction; RASS: Alert and Calm (0)                      rr5 

05:59 Drug: NS 0.9% 1000 ml Route: IV; Rate: 1000 ml; Site: left antecubital;                 rr5 

06:53 Follow up: Response: No adverse reaction; IV Status: Completed infusion; IV Intake:     rr5 

      1000ml                                                                                      

                                                                                                  

                                                                                                  

Disposition:                                                                                      

21 06:29 Discharged to Home. Impression: Ureterolithiasis.                                  

- Condition is Stable.                                                                            

- Discharge Instructions: Kidney Stones, Easy-to-Read.                                            

- Prescriptions for Zofran ODT 4 mg Oral tablet,disintegrating - place 1 tablet by                

  TRANSLINGUAL route every 8 hours As needed; 6 tablet. Colace 100 mg Oral Tablet -               

  take 1 tablet by ORAL route every 12 hours; 14 tablet. Tylenol- Codeine #3 300-30 mg            

  Oral Tablet - take 2 tablets by ORAL route every 4-6 hours As needed; 20 tablet.                

  Flomax 0.4 mg Oral Capsule, Sust. Release 24 hr - take 1 capsule by ORAL route once             

  daily 1/2 hour following the same meal each day; 10 capsule.                                    

- Medication Reconciliation Form, Thank You Letter, Antibiotic Education, Prescription            

  Opioid Use form.                                                                                

- Follow up: Private Physician; When: 1 - 2 days; Reason: Worsening of condition,                 

  Recheck today's complaints, Continuance of care, Re-evaluation by your physician.               

  Follow up: Joseph Rodriguez MD; When: 1 - 2 days; Reason: Worsening of condition,               

  Recheck today's complaints, Continuance of care, Re-evaluation by your physician.               

- Problem is new.                                                                                 

- Symptoms have improved.                                                                         

                                                                                                  

                                                                                                  

                                                                                                  

Signatures:                                                                                       

Dispatcher [employer]                           EDTarsha Lagunas RN                     RN   Lul Pearce RN                      RN   rr5                                                  

Isrrael Arenas MD MD   mh7                                                  

                                                                                                  

Corrections: (The following items were deleted from the chart)                                    

06:55 06:29 2021 06:29 Discharged to Home. Impression: Ureterolithiasis. Condition is   rr5 

      Stable. Forms are Medication Reconciliation Form, Thank You Letter, Antibiotic              

      Education, Prescription Opioid Use. Follow up: Private Physician; When: 1 - 2 days;         

      Reason: Worsening of condition, Recheck today's complaints, Continuance of care,            

      Re-evaluation by your physician. Follow up: Joseph Rodriguez; When: 1 - 2 days; Reason:       

      Worsening of condition, Recheck today's complaints, Continuance of care, Re-evaluation      

      by your physician. Problem is new. Symptoms have improved. mh7                              

                                                                                                  

**************************************************************************************************

## 2021-05-28 NOTE — ER
Nurse's Notes                                                                                     

 [facility]                                                                 

Name: John Ott                                                                           

Age: 56 yrs                                                                                       

Sex: Male                                                                                         

: 1965                                                                                   

MRN: [id number]                                                                                   

Arrival Date: 2021                                                                          

Time: 03:18                                                                                       

Account#: [id number]                                                                            

Bed 3                                                                                             

Private MD:                                                                                       

Diagnosis: Ureterolithiasis                                                                       

                                                                                                  

Presentation:                                                                                     

                                                                                             

03:57 Chief complaint: Patient states: he has been having right flank pain radiating to his   bb  

      abdomen for several days the pain went away for a while but now it is back denies           

      vomiting, diarrhea. Coronavirus screen: At this time, the client does not indicate any      

      symptoms associated with coronavirus-19. Ebola Screen: No symptoms or risks identified      

      at this time. Initial Sepsis Screen: Does the patient meet any 2 criteria? No.              

      Patient's initial sepsis screen is negative. Does the patient have a suspected source       

      of infection? No. Patient's initial sepsis screen is negative. Risk Assessment: Do you      

      want to hurt yourself or someone else? Patient reports no desire to harm self or            

      others. Onset of symptoms was May 24, 2021.                                                 

03:57 Method Of Arrival: Ambulatory                                                             

03:57 Acuity: FRANKIE 3                                                                           bb  

                                                                                                  

Historical:                                                                                       

- Allergies:                                                                                      

04:04 No Known Allergies;                                                                     bb  

- Home Meds:                                                                                      

04:04 losartan oral oral [Active]; Metformin Oral [Active]; atorvastatin oral oral [Active];  bb  

      Allopurinol Oral [Active]; Metoprolol Tartrate Oral [Active]; Glipizide Oral [Active];      

- PMHx:                                                                                           

04:04 Hyperlipidemia; Hypertension; CAD; Diabetes - NIDDM; Gout;                              bb  

- PSHx:                                                                                           

04:04 CABG; Heart cath; Cholecystectomy;                                                      bb  

                                                                                                  

- Immunization history:: Adult Immunizations up to date.                                          

- Social history:: Smoking status: Patient denies any tobacco usage or history of.                

  Patient uses alcohol, occasionally. Patient/guardian denies using street drugs.                 

                                                                                                  

                                                                                                  

Screenin:04 Abuse screen: Denies threats or abuse. Denies injuries from another. Nutritional        rr5 

      screening: No deficits noted. Tuberculosis screening: No symptoms or risk factors           

      identified. Fall Risk IV access (20 points). Total Cohen Fall Scale indicates No Risk       

      (0-24 pts).                                                                                 

                                                                                                  

Assessment:                                                                                       

04:00 General: Appears in no apparent distress. uncomfortable, Behavior is calm, cooperative, rr5 

      appropriate for age.                                                                        

04:00 Pain: Complains of pain in right flank Pain radiates to abdomen Pain currently is 8 out rr5 

      of 10 on a pain scale. Quality of pain is described as aching, Pain began gradually, Is     

      intermittent. Neuro: Level of Consciousness is awake, alert, obeys commands, Oriented       

      to person, place, time. Cardiovascular: Capillary refill < 3 seconds Patient's skin is      

      warm and dry. Respiratory: Airway is patent Respiratory effort is even, unlabored,          

      Respiratory pattern is regular, symmetrical. GI: Abdomen is round non-distended,            

      Reports lower abdominal pain, upper abdominal pain. : Reports pain in right flank(s).     

      EENT: No signs and/or symptoms were reported regarding the EENT system. Derm: Skin is       

      intact, is healthy with good turgor, Skin temperature is warm. Musculoskeletal:             

      Capillary refill < 3 seconds.                                                               

04:50 Reassessment: Patient appears in no apparent distress at this time. No changes from     rr5 

      previously documented assessment. Patient is alert, oriented x 3, equal unlabored           

      respirations, skin warm/dry/pink. awaiting to be seen.                                      

06:01 Reassessment: Patient appears in no apparent distress at this time. Patient is alert,   rr5 

      oriented x 3, equal unlabored respirations, skin warm/dry/pink. Patient states symptoms     

      have improved. Pain: Pain currently is 3 out of 10 on a pain scale.                         

                                                                                                  

Vital Signs:                                                                                      

03:57  / 106; Pulse 98; Resp 16 S; Temp 98(O); Pulse Ox 99% on R/A; Weight 81.65 kg     bb  

      (R); Height 5 ft. 6 in. (167.64 cm) (R); Pain 9/10;                                         

05:20  / 70; Pulse 89; Resp 17; Pulse Ox 99% ;                                          rr5 

06:00  / 95; Pulse 95; Resp 17; Pulse Ox 99% ;                                          rr5 

06:54  / 85; Pulse 90; Resp 19; Pulse Ox 98% ;                                          rr5 

03:57 Body Mass Index 29.05 (81.65 kg, 167.64 cm)                                               

                                                                                                  

ED Course:                                                                                        

03:18 Patient arrived in ED.                                                                  es  

03:50 Lul Olivera RN is Primary Nurse.                                                    rr5 

03:59 Triage completed.                                                                       bb  

04:01 Isrrael Arenas MD is Attending Physician.                                             mh7 

04:04 No provider procedures requiring assistance completed.                                  rr5 

04:04 Arm band placed on Patient placed in an exam room, on a stretcher, on pulse oximetry.   bb  

04:05 Patient has correct armband on for positive identification. Bed in low position. Call   rr5 

      light in reach. Pulse ox on. NIBP on.                                                       

04:20 Inserted saline lock: 20 gauge in left antecubital area, using aseptic technique. Blood rr5 

      collected.                                                                                  

04:30 Urine collected: clean catch specimen, clear.                                           rr5 

05:26 CT Stone Protocol In Process Unspecified.                                               EDMS

06:28 Joseph Rodriguez MD is Referral Physician.                                              mh7 

06:55 IV discontinued, intact, bleeding controlled, No redness/swelling at site. Pressure     rr5 

      dressing applied.                                                                           

                                                                                                  

Administered Medications:                                                                         

05:10 Drug: Zofran (Ondansetron) 4 mg Route: IVP; Site: left antecubital;                     rr5 

06:00 Follow up: Response: No adverse reaction                                                rr5 

05:12 Drug: morphine 4 mg {Note: rass 0.} Route: IVP; Site: left antecubital;                 rr5 

06:00 Follow up: Response: No adverse reaction; RASS: Alert and Calm (0)                      rr5 

05:59 Drug: NS 0.9% 1000 ml Route: IV; Rate: 1000 ml; Site: left antecubital;                 rr5 

06:53 Follow up: Response: No adverse reaction; IV Status: Completed infusion; IV Intake:     rr5 

      1000ml                                                                                      

                                                                                                  

                                                                                                  

Intake:                                                                                           

06:53 IV: 1000ml; Total: 1000ml.                                                              rr5 

                                                                                                  

Outcome:                                                                                          

06:29 Discharge ordered by MD.                                                                7 

06:55 Discharged to home ambulatory.                                                          rr5 

06:55 Condition: stable                                                                           

06:55 Discharge instructions given to patient, Instructed on discharge instructions, follow       

      up and referral plans. medication usage, Demonstrated understanding of instructions,        

      follow-up care, medications, Prescriptions given X 4.                                       

06:55 Patient left the ED.                                                                    rr5 

                                                                                                  

Signatures:                                                                                       

Dispatcher [employer]                           Renuka Traore Brenda, RN                     RN   Lul Pearce RN                      RN   rr5                                                  

Isrrael Arenas MD MD   7                                                  

                                                                                                  

**************************************************************************************************

## 2021-05-28 NOTE — XMS REPORT
Continuity of Care Document

                             Created on:May 28, 2021



Patient:DAYDAY PETER

Sex:Male

:1965

External Reference #:[id number]





Demographics







                          Address                   [address] [place] [address]

 

                          Home Phone                [phone]

 

                          Work Phone                [phone]

 

                          Email Address             [email or web address]

 

                          Preferred Language        English

 

                          Marital Status            Unknown

 

                          Latter day Affiliation     Unknown

 

                          Race                      Unknown

 

                          Additional Race(s)        Unavailable

 

                          Ethnic Group              Unknown









Author







                          Organization              [facility]

t

 

                          Address                   [address] [place] [address] [address]



                                                    [place] [address]

 

                          Phone                     [phone]









Support







                Name            Relationship    Address         Phone

 

                [place]        Unavailable     [address] [facility]    [phone]



                                                [place] [address] 

 

                [place]        Unavailable     [address] [facility]    [phone]



                                                [place] [address] 









Care Team Providers







                    Name                Role                Phone

 

                    Unavailable         Unavailable         Unavailable









Payers







           Payer Name Policy Type Policy Number Effective Date Expiration Date S

ource







Problems

This patient has no known problems.



Allergies, Adverse Reactions, Alerts







       Allergy Allergy Status Severity Reaction(s) Onset  Inactive Treating Comm

ents 

Source



       Name   Type                        Date   Date   Clinician        

 

       No Known DA     Active U             2011                      [facility]



       Allergie                                                     Clear



       s                                  00:00:                      [address]







Medications

This patient has no known medications.



Procedures

This patient has no known procedures.



Results







           Test Description Test Time  Test Comments Results    Result Comments 

Source

 

           SURG       2019            ---------------------            



                      12:56:00              ---------------------            



                                            ---------------------            



                                            ---------------------            



                                            --------RUN DATE:            



                                            19              



                                            [facility] - LAB *LIVE*            



                                                        PAGE 1            



                                            RUN TIME: 9946            



                                                                  



                                            Specimen Inquiry            



                                                          RUN            



                                            USER: INTERFACE            



                                                                  



                                                                  



                                                                  



                                            ---------------------            



                                            ---------------------            



                                            ---------------------            



                                            ---------------------            



                                            --------PATIENT:            



                                            DAYDAY PETER            



                                                    ACCT #:            



                                            [id number] LOC:            



                                            L.2S       U #:            



                                            [id number]            



                                                                  



                                                 AGE/SX: 54/M            



                                                ROOM: [facility]            



                                            RE19REG DR:            



                                            Clark Tipton MD            



                                                    :            



                                            61     BED:  1            



                                                    DIS: 19            



                                                                  



                                                                  



                                            STATUS: DIS Александр            



                                            TLOC:                 



                                            ---------------------            



                                            ---------------------            



                                            ---------------------            



                                            ---------------------            



                                            -------- SPEC #:            



                                            PMC:[id number]            



                                            RECD:             



                                              STATUS:  [facility]RAM[facility]IRIT            



                                                REQ #: [id number]            



                                                                  



                                              MOY:             



                                                [facility] DR:            



                                            Clark Tipton MD            



                                                     ENTERED:            



                                                SP            



                                            TYPE: SURG            



                                            OTHR DR: No Primary            



                                            or Family Physician            



                                                                  



                                                                  



                                                         Self            



                                            Referred              



                                                                  



                                                                  



                                              Issac Bailon MDORDERED:  SURG PATH            



                                            LVL 3                 



                                                                  



                                                                  



                                                   COPIES TO:            



                                            No Primary or Family            



                                            Physician    Self            



                                            Referred              



                                            Clark Tipton MD            



                                             [address] [address]   [address]            



                                            [place] [address] [phone]            



                                            [email or web address]            



                                            Issac Cook MD            



                                              [address]            



                                            [address]   [place] [address] [phone]            



                                            HISTOLOGY:   TISSUE            



                                                   ID    BLK            



                                            PCS ABIGAIL LEV PROCEDURE            



                                                  DISPOSITION            



                                            _______________ ____            



                                            ______ ___ ___ ___            



                                            _______________            



                                            ___________            



                                            GALLBLADDER, NO A            



                                            1-2             1            



                                                                  



                                            PROCEDURES: SURG PATH            



                                            LVL 3 ()            



                                            TISSUES:           A.            



                                            GALLBLADDER, NOS -            



                                            GALLBLADDER            



                                            CLINICAL HISTORY            



                                            ABD PAIN              



                                            CPT CODES    CPT            



                                            CODE(S): 22547      ,            



                                                      ,            



                                             ,           ,            



                                               ,            ,            



                                                                  



                                                      **            



                                            CONTINUED ON NEXT            



                                            PAGE **               



                                            ---------------------            



                                            ---------------------            



                                            ---------------------            



                                            ---------------------            



                                            --------RUN DATE:            



                                            19              



                                            [facility] - [facility] *LIVE*            



                                                        PAGE 2            



                                            RUN TIME: 1256            



                                                                  



                                            Specimen Inquiry            



                                                          RUN            



                                            USER: INTERFACE            



                                                                  



                                                                  



                                                                  



                                            ---------------------            



                                            ---------------------            



                                            ---------------------            



                                            ---------------------            



                                            --------SPEC #:            



                                            [facility]:[id number]            



                                            PATIENT:              



                                            DAYDAY PETER            



                                                                  



                                            #[id number]            



                                            (Continued)----------            



                                            ---------------------            



                                            ---------------------            



                                            ---------------------            



                                            -------------------            



                                                   FINAL            



                                            DIAGNOSIS             



                                            Gallbladder,            



                                            laparoscopic            



                                            cholecystectomy:            



                                             MILD CHRONIC            



                                            CHOLECYSTITIS WITH            



                                            CHOLELITHIASIS            



                                            GROSS DESCRIPTION            



                                            Gallbladder.            



                                            Received in formalin            



                                            is a collapsed            



                                            gallbladder, 8.5 x            



                                            2.7 x 2.0   cm with a            



                                            wall, 0.3 cm in            



                                            average thickness and            



                                            a defect in the body            



                                            extending   to the            



                                            gallbladder neck, 3.0            



                                            cm.  The mucosa is            



                                            brown-green, smooth            



                                            and   velvety.  The            



                                            lumen contains scanty            



                                            bile and numerous            



                                            black friable            



                                            calculi,   2.0 x 1.7            



                                            x 0.3 cm in            



                                            aggregate.  Five            



                                            additional similar            



                                            calculi are present            



                                            in the container            



                                            measuring 1.0 x 0.7 x            



                                            0.3 cm in aggregate.            



                                            Representative            



                                            section submitted as            



                                            A.  /ba/pdb            



                                            Grossing performed at            



                                            [facility] Pathology, [facility]            



                                            [facility], [address],            



                                            [place] [address].            



                                             Medical Director:            



                                            Champ Dooley M.D.            



                                               MICROSCOPIC            



                                            DESCRIPTION            



                                            Gallbladder.            



                                            Sections demonstrate            



                                            gallbladder wall with            



                                            glandular mucosa.            



                                            Focal dilated            



                                            Rokitansky-Aschoff            



                                            sinuses are            



                                            identified.  There is            



                                            mild patchy   chronic            



                                            inflammation in the            



                                            wall of the            



                                            gallbladder.  There            



                                            is no evidence of            



                                            dysplasia or            



                                            malignancy.            



                                            /cm------------------            



                                            ---------------------            



                                            ---------------------            



                                            ---------------------            



                                            ----------- Signed            



                                            SIGNATURE ON [place] [place]            



                                            19 1256            



                                            ---------------------            



                                            ---------------------            



                                            ---------------------            



                                            ---------------------            



                                            --------              



                                                                  



                                                               **            



                                            END OF REPORT **            









                    COMPREHENSIVE METABOLIC PANEL 2019 18:11:00 









                      Test Item  Value      Reference Range Interpretation Comme

nts









             SODIUM (test code = NA) 137 mmol/L   134-147      N            

 

             POTASSIUM (test code = K) 4.4 mmol/L   3.4-5.0      N            

 

             CHLORIDE (test code = CL) 107 mmol/L   100-108      N            

 

             CARBON DIOXIDE (test code = CO2) 23 mmol/L    21-32        N       

     

 

             ANION GAP (test code = GAP) 7.0 GAP calc 4.0-15.0     N            

 

             GLUCOSE (test code = GLU) 239 MG/DL           H            

 

             BLOOD UREA NITROGEN (test code = BUN) 10 MG/DL     7-18         N  

          

 

             GLOMERULAR FILTRATION RATE (test code = GFR) >=60 max estimate estG

FR >60                       

 

             CREATININE (test code = CREAT) 1.3 MG/DL    0.8-1.3      N         

   

 

             TOTAL PROTEIN (test code = PROT) 7.1 G/DL     6.4-8.2      N       

     

 

             ALBUMIN (test code = ALB) 3.2 G/DL     3.4-5.0      L            

 

             GLOBULIN (test code = GLOB) 3.9 GM/dL                              

 

             ALBUMIN/GLOBULIN RATIO (test code = A/G) 0.8 RATIO    1.2-2.2      

L            

 

             CALCIUM (test code = CA) 7.9 MG/DL    8.5-10.1     L            

 

             BILIRUBIN TOTAL (test code = BILT) 1.20 MG/DL   0.2-1.2      N     

       

 

             SGOT/AST (test code = AST) 91 Unit/L    15-37        H            

 

             SGPT/ALT (test code = ALT) 139 Unit/L   12-78        H            

 

             ALKALINE PHOSPHATASE TOTAL (test code = ALKP) 81 Unit/L      

     N            



GLUCOSE BEDSIDE [id number] 16:44:00





             Test Item    Value        Reference Range Interpretation Comments

 

             GLUCOSE BEDSIDE TESTING (test code 147 mg/dL           H     

       



             = GLUBED)                                           



CBC W/AUTO [id number] 16:07:00





             Test Item    Value        Reference Range Interpretation Comments

 

             WHITE BLOOD CELL (test code = 8.3 K/mm3    3.5-11.0     N          

  



             WBC)                                                

 

             RED BLOOD CELL (test code = RBC) 4.92 M/mm3   4.70-6.10    N       

     

 

             HEMOGLOBIN (test code = HGB) 14.0 G/DL    12.3-15.9    N           

 

 

             HEMATOCRIT (test code = HCT) 41.5 %       35.8-46.7    N           

 

 

             MEAN CELL VOLUME (test code = 84.3 Fl      86.3-98.9    L          

  



             MCV)                                                

 

             MEAN CELL HGB (test code = MCH) 28.5 pg      28.9-34.4    L        

    

 

             MEAN CELL HGB CONCETRATION (test 33.7 G/DL    32.1-34.5    N       

     



             code = MCHC)                                        

 

             RED CELL DISTRIBUTION WIDTH (test 13.2 SD      11.5-14.5    N      

      



             code = RDW)                                         

 

             PLATELET COUNT (test code = PLT) 253.0 K/mm3  150-450      N       

     

 

             MEAN PLATELET VOLUME (test code = 10.30 fL     7.0-9.6      H      

      



             MPV)                                                

 

             NEUTROPHIL % (test code = NT%) 67.6 %       40-76                  

   

 

             LYMPHOCYTE % (test code = LY%) 21.1 %       20.5-51.1    N         

   

 

             MONOCYTE % (test code = MO%) 8.0 %        1.7-9.3      N           

 

 

             EOSINOPHIL % (test code = EO%) 2.7 %        0.0-6.0      N         

   

 

             BASOPHIL % (test code = BA%) 0.6 %        0.0-2.0      N           

 

 

             NEUTROPHIL # (test code = NT#) 5.59 K/mm3   1.8-7.6      N         

   

 

             LYMPHOCYTE # (test code = LY#) 1.7 K/mm3    0.6-3.0      N         

   

 

             MONOCYTE # (test code = MO#) 0.7 K/mm3    0.2-1.5      N           

 

 

             EOSINOPHIL # (test code = EO#) 0.2 K/mm3    0.0-0.4      N         

   

 

             BASOPHIL # (test code = BA#) 0.1 K/mm3    0.0-0.2      N           

 

 

             MANUAL DIFF REQUIRED (test code = NO DIFF/SCN  CRITERIA            

      



             MDIFF)                                              



GLUCOSE BEDSIDE [id number] 13:00:00





             Test Item    Value        Reference Range Interpretation Comments

 

             GLUCOSE BEDSIDE TESTING (test code 143 mg/dL           H     

       



             = GLUBED)                                           



GLUCOSE BEDSIDE [id number] 10:06:00





             Test Item    Value        Reference Range Interpretation Comments

 

             GLUCOSE BEDSIDE TESTING (test code 113 mg/dL           H     

       



             = GLUBED)                                           



GLUCOSE BEDSIDE [id number] 07:51:00





             Test Item    Value        Reference Range Interpretation Comments

 

             GLUCOSE BEDSIDE TESTING (test code 131 mg/dL           H     

       



             = GLUBED)                                           



COMPREHENSIVE METABOLIC [id number] 07:11:00





             Test Item    Value        Reference Range Interpretation Comments

 

             SODIUM (test code = NA) 141 mmol/L   134-147      N            

 

             POTASSIUM (test code = 3.9 mmol/L   3.4-5.0      N            



             K)                                                  

 

             CHLORIDE (test code = 110 mmol/L   100-108      H            



             CL)                                                 

 

             CARBON DIOXIDE (test 25 mmol/L    21-32        N            



             code = CO2)                                         

 

             ANION GAP (test code = 6.0 GAP calc 4.0-15.0     N            



             GAP)                                                

 

             GLUCOSE (test code = 123 MG/DL           H            



             GLU)                                                

 

             BLOOD UREA NITROGEN 10 MG/DL     7-18         N            



             (test code = BUN)                                        

 

             GLOMERULAR FILTRATION >=60 max estimate >60                       



             RATE (test code = GFR) estGFR                                 

 

             CREATININE (test code = 1.3 MG/DL    0.8-1.3      N            



             CREAT)                                              

 

             TOTAL PROTEIN (test code 7.3 G/DL     6.4-8.2      N            



             = PROT)                                             

 

             ALBUMIN (test code = 3.5 G/DL     3.4-5.0      N            



             ALB)                                                

 

             GLOBULIN (test code = 3.8 GM/dL                              



             GLOB)                                               

 

             ALBUMIN/GLOBULIN RATIO 0.9 RATIO    1.2-2.2      L            



             (test code = A/G)                                        

 

             CALCIUM (test code = CA) 8.3 MG/DL    8.5-10.1     L            

 

             BILIRUBIN TOTAL (test 1.40 MG/DL   0.2-1.2      H            



             code = BILT)                                        

 

             SGOT/AST (test code = 102 Unit/L   15-37        H            



             AST)                                                

 

             SGPT/ALT (test code = 151 Unit/L   12-78        H            



             ALT)                                                

 

             ALKALINE PHOSPHATASE 99 Unit/L           N            



             TOTAL (test code = ALKP)                                        



COMPREHENSIVE METABOLIC [id number] 06:59:00





             Test Item    Value        Reference Range Interpretation Comments

 

             SODIUM (test code = NA) 141 mmol/L   134-147      N            

 

             POTASSIUM (test code = K) 3.9 mmol/L   3.4-5.0      N            

 

             CHLORIDE (test code = CL) 110 mmol/L   100-108      H            

 

             CARBON DIOXIDE (test code = CO2) 25 mmol/L    21-32        N       

     

 

             ANION GAP (test code = GAP) 6.0 GAP calc 4.0-15.0     N            

 

             GLUCOSE (test code = GLU) 123 MG/DL           H            

 

             BLOOD UREA NITROGEN (test code = 10 MG/DL     7-18         N       

     



             BUN)                                                

 

             GLOMERULAR FILTRATION RATE (test  estGFR      >60                  

     



             code = GFR)                                         

 

             CREATININE (test code = CREAT)  MG/DL       0.8-1.3                

   

 

             TOTAL PROTEIN (test code = PROT)  G/DL        6.4-8.2              

     

 

             ALBUMIN (test code = ALB)  G/DL        3.4-5.0                   

 

             GLOBULIN (test code = GLOB)  GM/dL                                 

 

             ALBUMIN/GLOBULIN RATIO (test  RATIO       1.2-2.2                  

 



             code = A/G)                                         

 

             CALCIUM (test code = CA) 8.3 MG/DL    8.5-10.1     L            

 

             BILIRUBIN TOTAL (test code =  MG/DL       0.2-1.2                  

 



             BILT)                                               

 

             SGOT/AST (test code = AST)  Unit/L      15-37                     

 

             SGPT/ALT (test code = ALT)  Unit/L      12-78                     

 

             ALKALINE PHOSPHATASE TOTAL (test  Unit/L                     

     



             code = ALKP)                                        



CBC W/AUTO [id number] 06:53:00





             Test Item    Value        Reference Range Interpretation Comments

 

             WHITE BLOOD CELL (test code = 8.3 K/mm3    3.5-11.0     N          

  



             WBC)                                                

 

             RED BLOOD CELL (test code = RBC) 4.92 M/mm3   4.70-6.10    N       

     

 

             HEMOGLOBIN (test code = HGB) 14.0 G/DL    12.3-15.9    N           

 

 

             HEMATOCRIT (test code = HCT) 41.5 %       35.8-46.7    N           

 

 

             MEAN CELL VOLUME (test code = 84.3 Fl      86.3-98.9    L          

  



             MCV)                                                

 

             MEAN CELL HGB (test code = MCH) 28.5 pg      28.9-34.4    L        

    

 

             MEAN CELL HGB CONCETRATION (test 33.7 G/DL    32.1-34.5    N       

     



             code = MCHC)                                        

 

             RED CELL DISTRIBUTION WIDTH (test 13.2 SD      11.5-14.5    N      

      



             code = RDW)                                         

 

             PLATELET COUNT (test code = PLT) 253.0 K/mm3  150-450      N       

     

 

             MEAN PLATELET VOLUME (test code = 10.30 fL     7.0-9.6      H      

      



             MPV)                                                

 

             NEUTROPHIL % (test code = NT%) 67.6 %       40-76                  

   

 

             LYMPHOCYTE % (test code = LY%) 21.1 %       20.5-51.1    N         

   

 

             MONOCYTE % (test code = MO%) 8.0 %        1.7-9.3      N           

 

 

             EOSINOPHIL % (test code = EO%) 2.7 %        0.0-6.0      N         

   

 

             BASOPHIL % (test code = BA%) 0.6 %        0.0-2.0      N           

 

 

             NEUTROPHIL # (test code = NT#) 5.59 K/mm3   1.8-7.6      N         

   

 

             LYMPHOCYTE # (test code = LY#) 1.7 K/mm3    0.6-3.0      N         

   

 

             MONOCYTE # (test code = MO#) 0.7 K/mm3    0.2-1.5      N           

 

 

             EOSINOPHIL # (test code = EO#) 0.2 K/mm3    0.0-0.4      N         

   

 

             BASOPHIL # (test code = BA#) 0.1 K/mm3    0.0-0.2      N           

 

 

             MANUAL DIFF REQUIRED (test code =  DIFF/SCN    CRITERIA            

      



             MDIFF)                                              



- XR CHEST 1 -66-07 23:12:00 Name: DAYDAY PETER                [facility]                      : 1965 Age/S: 54  / M         [id number] 
[facility]              Unit #: [id number]     Loc:               [place]
[address]                Phys: Clark Tipton MD                                 
              Acct: [id number]  Dis Date:               Status: ADM IN        
                         PHONE #: [phone]     Exam Date: 2019  2300  
                  FAX #:                  Reason: FOR SURGERY IN AM.            
                    EXAMS:                                               CPT:   
     [id number] XR CHEST 1 V                               [address]             
Fluoro Time:   DAP (Gy m2):          Air Kerma (mGy):                      
HISTORY: Chest pain.       Location: C3        COMPARISON:2012              
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       There
is mild cardiomegaly.  No vascular congestion.        The lungs are clear of f
ocal consolidation. No effusion,        pneumothorax, or acute osseous 
abnormality.       IMPRESSION:                   1. Heart size is upper normal. 
No focal consolidation.           ** Electronically Signed by TIMOTEO Reyes **            **             on 2019 at 2312            **           
          Reported and signed by: Jaison Reyes M.D.              CC: Clark Tipton MD                                                      PAGE  1         
             Signed Report                             Name: DAYDAY PETER Dowell    : 1965 Age/S: 54  / M         [id number] 
[address]              Unit #: [id number]     Loc:               [place]
[address]                Phys: Clark Tipton MD                       Acct: 
[id number]  Dis Date:               Status: ADM IN              PHONE #: 
[phone]     Exam Date: 2019  2300                     FAX #:         
 Reason: FOR SURGERY IN AM.                                 EXAMS:              
       CPT:           [id number] XR CHEST 1 V                               [address]
      Fluoro Time:            DAP (Gy m2):          Air Kerma (mGy):         
&lt;Continued&gt; Technologist: Kelli Beck RT(R)(CT)                      
      Trnscb Date/Time: 2019 (2312) t.SDR.RXC2                       Orig 
Print D/T: S: 2019 (2315)            PAGE  2                       Signed 
Report- CT ABD PELVIS [id number] 19:50:00  Name: DAYDAY PETER Dowell                      : 1965 Age/S: 54  / M       
 [id number] [address]              Unit #: [id number]     Loc:               
[place] [address]                Phys: Clark Tipton MD                    
                           Acct: [id number]  Dis Date:               Status: 
ADM IN                                  PHONE #: [phone]     Exam Date: 
2019                     FAX #:                   Reason: Abd pain  
                                         EXAMS:                                 
             CPT:           [id number] CT ABD PELVIS W/CONT                      
[address]                    CT ABDOMEN AND PELVIS WITH IV CONTRAST               
HISTORY:  Abd pain                COMPARISON: MRI abdomen 12.               
TECHNIQUE: Axial CT images of the abdomen and pelvis were obtained       with 
coronal and/or sagittal reformatted views.  Automated exposure       control, 
iterative reconstruction technique, and/or adjustment of mA       and/or kV 
according to patient's size was utilized for radiation dose       reduction.    
    IV CONTRAST:  100 ml Isovue-300.       PO CONTRAST: None.       Location: 
B2.               FINDINGS:               Mild atelectasis present in both lung 
bases.  The heart size is       normal.  Coronary artery calcifications.  
Epicardial pacemaker leads.        Sternotomy wires.               The 
gallbladder is distended.  There are few small gallstones in theneck of the 
gallbladder.  There may be a small amount of       pericholecystic fluid and 
minimal inflammatory changes.  Mild diffuse       low attenuation seen 
throughout the liver suggestive of cyst    steatosis.  No focal hepatic lesion. 
Spleen, pancreas and adrenal       glands are unremarkable.               Both 
kidneys are similar in size, shape and enhancement without       evidence of 
hydronephrosis.  There are at least 3 nonobstructing right       renal stones 
measuring up to 3 mm.  No definite left renal stone.               Urinary 
bladder is partially distended without wall thickening.  The       prostate is 
normal in size.               No free air, free fluid or evidence of a bowel 
obstruction.  Normal       appendix.  No bowel wall thickening.               
The aorta is normal in caliber with mild to moderate atherosclerotic       
disease.  No abdominal or pelvic adenopathy.            Mild lumbar spondylosis.
                IMPRESSION:               PAGE  1        Signed Report          
         (CONTINUED)   Name: DAYDAY PETER DERICK               [facility]    
                 : 1965 Age/S: 54  / M         [id number] [address]      
       Unit #: [id number]     Loc:               [place] [address]             
  Phys: Clark Tipton MD                                                
Acct: [id number]  Dis Date:               Status: ADM IN                      
           PHONE #: [phone]     Exam Date: 2019            FAX 
#:                   Reason: Abd pain                                           
EXAMS:                                               CPT:           [id number] CT
ABD PELVIS W/CONT                [address]               &lt;Continued&gt;          
Cholelithiasis.  Minimal inflammatorychanges and probable         
pericholecystic fluid concerning for acute cholecystitis.  A right   upper 
quadrant ultrasound may be helpful for further assessment.                   
Normal appendix.                   Multiple nonobstructing right renal stones.  
               ** Electronically Signed by TIMOTEO Angelo **          
**               on 2019 at 1950              **                   
Reported and signed by: Darshan Angelo M.D.                               
CC: Clark Tipton MD                                    Technologist:Edmundo Nava, RT(R)(CT); ..  CTDI:        DLP:     Trnscb Date/Time: 2019 
() t.SDR.SP17                       Orig Print D/T: S: 2019 ()    
PAGE  2                       Signed ReportLACTIC [id number] 18:23:00





             Test Item    Value        Reference Range Interpretation Comments

 

             LACTIC ACID (test code = LACT) 1.4 mmol/L   0.4-2.0      N         

   



COMPREHENSIVE METABOLIC [id number] 18:09:00





             Test Item    Value        Reference Range Interpretation Comments

 

             SODIUM (test code = NA) 141 mmol/L   134-147      N            

 

             POTASSIUM (test code = 3.9 mmol/L   3.4-5.0      N            



             K)                                                  

 

             CHLORIDE (test code = 110 mmol/L   100-108      H            



             CL)                                                 

 

             CARBON DIOXIDE (test 26 mmol/L    21-32        N            



             code = CO2)                                         

 

             ANION GAP (test code = 5.0 GAP calc 4.0-15.0     N            



             GAP)                                                

 

             GLUCOSE (test code = 116 MG/DL           H            



             GLU)                                                

 

             BLOOD UREA NITROGEN 13 MG/DL     7-18         N            



             (test code = BUN)                                        

 

             GLOMERULAR FILTRATION >=60 max estimate >60                       



             RATE (test code = GFR) estGFR                                 

 

             CREATININE (test code = 1.3 MG/DL    0.8-1.3      N            



             CREAT)                                              

 

             TOTAL PROTEIN (test code 7.4 G/DL     6.4-8.2      N            



             = PROT)                                             

 

             ALBUMIN (test code = 3.6 G/DL     3.4-5.0      N            



             ALB)                                                

 

             GLOBULIN (test code = 3.8 GM/dL                              



             GLOB)                                               

 

             ALBUMIN/GLOBULIN RATIO 1.0 RATIO    1.2-2.2      L            



             (test code = A/G)                                        

 

             CALCIUM (test code = CA) 8.4 MG/DL    8.5-10.1     L            

 

             BILIRUBIN TOTAL (test 1.70 MG/DL   0.2-1.2      H            



             code = BILT)                                        

 

             SGOT/AST (test code = 130 Unit/L   15-37        H            



             AST)                                                

 

             SGPT/ALT (test code = 102 Unit/L   12-78        H            



             ALT)                                                

 

             ALKALINE PHOSPHATASE 76 Unit/L           N            



             TOTAL (test code = ALKP)                                        



LIPID PROFILE (CORONARY RISK)2019 18:09:00





             Test Item    Value        Reference Range Interpretation Comments

 

             TRIGLYCERIDES (test code = TRIG) 93 MG/DL     0-150        N       

     

 

             CHOLESTEROL (test code = CHOL) 140 MG/DL    133-200      N         

   

 

             CHOLESTEROL/HDL RATIO (test code = 2.50 RATIO   >0                 

       



             CHOLHDL)                                            

 

             HDL CHOLESTEROL (test code = HDL) 56 MG/DL     40-59        N      

      

 

             NON-HDL CHOLESTEROL (test code = 84 mg/dL     <130                 

     



             NHDL)                                               

 

             LIPOPROTEIN LDL (test code = LDL) 69 MG/DL     0-129        N      

      

 

             LDL/HDL (test code = LDL/HDL) 1.23 Ratio   1.48-3.22 Avg L         

   



[id number] 18:09:00





             Test Item    Value        Reference Range Interpretation Comments

 

             PHOSPHOROUS (test code = PHOS) 2.5 MG/DL    2.5-4.9      N         

   



RULE OUT MI [id number] 18:09:00





             Test Item    Value        Reference Range Interpretation Comments

 

             CREATINE KINASE 179 Unit/L          N            



             (CK) (test code =                                        



             CK)                                                 

 

             TROPONIN-I (test < 0.015 NG/ML 0.000-0.045  N            Negative: 

</= 0.045



             code = TROPI)                                        Positive: >/= 

0.046



                                                                 Correlation wit

h



                                                                 serial results,

 other



                                                                 cardiac markers

, and



                                                                 clinical findin

gs is



                                                                 necessary to de

termine



                                                                 the clinical



                                                                 significance of

 this



                                                                 result. Quantit

ative



                                                                 results using



                                                                 different



                                                                 methodologies s

hould



                                                                 not be compared

 to one



                                                                 another as nume

rical



                                                                 results may promise

yby



                                                                 method.



[id number] 18:09:00





             Test Item    Value        Reference Range Interpretation Comments

 

             LIPASE (test code = LIP) 457 Unit/L   114-286      H            



[id number] 18:09:00





             Test Item    Value        Reference Range Interpretation Comments

 

             MAGNESIUM (test code = MAG) 2.0 MG/DL    1.8-2.4      N            



COMPREHENSIVE METABOLIC [id number] 18:05:00





             Test Item    Value        Reference Range Interpretation Comments

 

             SODIUM (test code = NA) 141 mmol/L   134-147      N            

 

             POTASSIUM (test code = K) 3.9 mmol/L   3.4-5.0      N            

 

             CHLORIDE (test code = CL) 110 mmol/L   100-108      H            

 

             CARBON DIOXIDE (test code = CO2) 26 mmol/L    21-32        N       

     

 

             ANION GAP (test code = GAP) 5.0 GAP calc 4.0-15.0     N            

 

             GLUCOSE (test code = GLU) 116 MG/DL           H            

 

             BLOOD UREA NITROGEN (test code = 13 MG/DL     7-18         N       

     



             BUN)                                                

 

             GLOMERULAR FILTRATION RATE (test  estGFR      >60                  

     



             code = GFR)                                         

 

             CREATININE (test code = CREAT)  MG/DL       0.8-1.3                

   

 

             TOTAL PROTEIN (test code = PROT)  G/DL        6.4-8.2              

     

 

             ALBUMIN (test code = ALB)  G/DL        3.4-5.0                   

 

             GLOBULIN (test code = GLOB)  GM/dL                                 

 

             ALBUMIN/GLOBULIN RATIO (test  RATIO       1.2-2.2                  

 



             code = A/G)                                         

 

             CALCIUM (test code = CA) 8.4 MG/DL    8.5-10.1     L            

 

             BILIRUBIN TOTAL (test code =  MG/DL       0.2-1.2                  

 



             BILT)                                               

 

             SGOT/AST (test code = AST)  Unit/L      15-37                     

 

             SGPT/ALT (test code = ALT)  Unit/L      12-78                     

 

             ALKALINE PHOSPHATASE TOTAL (test  Unit/L                     

     



             code = ALKP)                                        



LIPID PROFILE (CORONARY RISK)2019 18:05:00





             Test Item    Value        Reference Range Interpretation Comments

 

             TRIGLYCERIDES (test code = TRIG)  MG/DL       0-150                

     

 

             CHOLESTEROL (test code = CHOL)  MG/DL       133-200                

   

 

             CHOLESTEROL/HDL RATIO (test code =  RATIO       >0                 

       



             CHOLHDL)                                            

 

             HDL CHOLESTEROL (test code = HDL)  MG/DL       40-59               

      

 

             NON-HDL CHOLESTEROL (test code = NHDL)  mg/dL       <130           

           

 

             LIPOPROTEIN LDL (test code = LDL)  MG/DL       0-129               

      

 

             LDL/HDL (test code = LDL/HDL)  Ratio       1.48-3.22 Avg           

   



[id number] 18:05:00





             Test Item    Value        Reference Range Interpretation Comments

 

             PHOSPHOROUS (test code = PHOS)  MG/DL       2.5-4.9                

   



RULE OUT MI [id number] 18:05:00





             Test Item    Value        Reference Range Interpretation Comments

 

             CREATINE KINASE (CK) (test code = CK)  Unit/L                

          

 

             TROPONIN-I (test code = TROPI)  NG/ML       0.000-0.045            

   



[id number] 18:05:00





             Test Item    Value        Reference Range Interpretation Comments

 

             LIPASE (test code = LIP)  Unit/L      114-286                   



[id number] 18:05:00





             Test Item    Value        Reference Range Interpretation Comments

 

             MAGNESIUM (test code = MAG)  MG/DL       1.8-2.4                   



PROTHROMBIN [id number] 17:54:00





             Test Item    Value        Reference Range Interpretation Comments

 

             PT PATIENT (test code = PTP) 11.2 SECONDS 9.3-12.9     N           

 

 

             INTERNATIONAL NORMAL RATIO 0.97 INR Unit 0.8-1.2      N            



             (test code = INR)                                        



CBC W/AUTO [id number] 17:48:00





             Test Item    Value        Reference Range Interpretation Comments

 

             WHITE BLOOD CELL (test code = 9.0 K/mm3    3.5-11.0     N          

  



             WBC)                                                

 

             RED BLOOD CELL (test code = RBC) 5.10 M/mm3   4.70-6.10    N       

     

 

             HEMOGLOBIN (test code = HGB) 14.5 G/DL    12.3-15.9    N           

 

 

             HEMATOCRIT (test code = HCT) 43.4 %       35.8-46.7    N           

 

 

             MEAN CELL VOLUME (test code = 85.1 Fl      86.3-98.9    L          

  



             MCV)                                                

 

             MEAN CELL HGB (test code = MCH) 28.4 pg      28.9-34.4    L        

    

 

             MEAN CELL HGB CONCETRATION (test 33.4 G/DL    32.1-34.5    N       

     



             code = MCHC)                                        

 

             RED CELL DISTRIBUTION WIDTH (test 13.0 SD      11.5-14.5    N      

      



             code = RDW)                                         

 

             PLATELET COUNT (test code = PLT) 274.0 K/mm3  150-450      N       

     

 

             MEAN PLATELET VOLUME (test code = 10.50 fL     7.0-9.6      H      

      



             MPV)                                                

 

             NEUTROPHIL % (test code = NT%) 75.7 %       40-76        N         

   

 

             LYMPHOCYTE % (test code = LY%) 16.5 %       20.5-51.1    L         

   

 

             MONOCYTE % (test code = MO%) 6.9 %        1.7-9.3      N           

 

 

             EOSINOPHIL % (test code = EO%) 0.6 %        0.0-6.0      N         

   

 

             BASOPHIL % (test code = BA%) 0.3 %        0.0-2.0      N           

 

 

             NEUTROPHIL # (test code = NT#) 6.80 K/mm3   1.8-7.6      N         

   

 

             LYMPHOCYTE # (test code = LY#) 1.5 K/mm3    0.6-3.0      N         

   

 

             MONOCYTE # (test code = MO#) 0.6 K/mm3    0.2-1.5      N           

 

 

             EOSINOPHIL # (test code = EO#) 0.1 K/mm3    0.0-0.4      N         

   

 

             BASOPHIL # (test code = BA#) 0.0 K/mm3    0.0-0.2      N           

 

 

             MANUAL DIFF REQUIRED (test code = NO DIFF/SCN  CRITERIA            

      



             MDIFF)

## 2022-08-24 ENCOUNTER — HOSPITAL ENCOUNTER (EMERGENCY)
Dept: HOSPITAL 97 - ER | Age: 57
Discharge: HOME | End: 2022-08-24
Payer: COMMERCIAL

## 2022-08-24 VITALS — DIASTOLIC BLOOD PRESSURE: 80 MMHG | OXYGEN SATURATION: 98 % | SYSTOLIC BLOOD PRESSURE: 135 MMHG

## 2022-08-24 DIAGNOSIS — I10: ICD-10-CM

## 2022-08-24 DIAGNOSIS — N20.1: Primary | ICD-10-CM

## 2022-08-24 LAB
ALBUMIN SERPL BCP-MCNC: 4 G/DL (ref 3.4–5)
ALP SERPL-CCNC: 67 U/L (ref 45–117)
ALT SERPL W P-5'-P-CCNC: 44 U/L (ref 12–78)
AST SERPL W P-5'-P-CCNC: 22 U/L (ref 15–37)
BUN BLD-MCNC: 17 MG/DL (ref 7–18)
GLUCOSE SERPLBLD-MCNC: 206 MG/DL (ref 74–106)
HCT VFR BLD CALC: 44.6 % (ref 39.6–49)
LIPASE SERPL-CCNC: 462 U/L (ref 73–393)
LYMPHOCYTES # SPEC AUTO: 1.6 K/UL (ref 0.7–4.9)
MCV RBC: 84.6 FL (ref 80–100)
PMV BLD: 8.6 FL (ref 7.6–11.3)
POTASSIUM SERPL-SCNC: 3.7 MMOL/L (ref 3.5–5.1)
RBC # BLD: 5.27 M/UL (ref 4.33–5.43)

## 2022-08-24 PROCEDURE — 74176 CT ABD & PELVIS W/O CONTRAST: CPT

## 2022-08-24 PROCEDURE — 81003 URINALYSIS AUTO W/O SCOPE: CPT

## 2022-08-24 PROCEDURE — 85025 COMPLETE CBC W/AUTO DIFF WBC: CPT

## 2022-08-24 PROCEDURE — 83690 ASSAY OF LIPASE: CPT

## 2022-08-24 PROCEDURE — 80053 COMPREHEN METABOLIC PANEL: CPT

## 2022-08-24 PROCEDURE — 36415 COLL VENOUS BLD VENIPUNCTURE: CPT

## 2022-08-24 PROCEDURE — 76377 3D RENDER W/INTRP POSTPROCES: CPT

## 2022-08-24 NOTE — ER
Nurse's Notes                                                                                     

 [facility]                                                                 

Name: John Ott                                                                           

Age: 57 yrs                                                                                       

Sex: Male                                                                                         

: 1965                                                                                   

MRN: [id number]                                                                                   

Arrival Date: 2022                                                                          

Time: 09:08                                                                                       

Account#: [id number]                                                                            

Bed 23                                                                                            

Private MD:                                                                                       

Diagnosis: Calculus of ureter                                                                     

                                                                                                  

Presentation:                                                                                     

                                                                                             

09:13 Chief complaint: Patient states: he started having left sided flank pain with bloody    ap3 

      urination at approx 0700 this morning, patient denies NV. Coronavirus screen: At this       

      time, the client does not indicate any symptoms associated with coronavirus-19. Ebola       

      Screen: No symptoms or risks identified at this time. Initial Sepsis Screen: Does the       

      patient meet any 2 criteria? HR > 90 bpm. No. Patient's initial sepsis screen is            

      negative. Does the patient have a suspected source of infection? No. Patient's initial      

      sepsis screen is negative. Risk Assessment: Do you want to hurt yourself or someone         

      else? Patient reports no desire to harm self or others. Onset of symptoms was 2022.                                                                                   

09:13 Method Of Arrival: Ambulatory                                                           ap3 

09:13 Acuity: FRANKIE 3                                                                           ap3 

                                                                                                  

Triage Assessment:                                                                                

09:15 General: Appears uncomfortable, Behavior is restless. Pain: Complains of pain in right  ap3 

      low back Pain radiates to left lower quadrant Pain currently is 10 out of 10 on a pain      

      scale. Pain began gradually, 2 hours ago. Neuro: Level of Consciousness is awake,           

      alert, obeys commands, Oriented to person, place, time, situation. Cardiovascular:          

      Patient's skin is warm and dry. Respiratory: Airway is patent Respiratory effort is         

      even, unlabored. : Reports bloody urination.                                              

                                                                                                  

Historical:                                                                                       

- Allergies:                                                                                      

09:14 No Known Allergies;                                                                     ap3 

- PMHx:                                                                                           

09:14 CAD; Diabetes - NIDDM; Gout; Hyperlipidemia; Hypertension;                              ap3 

                                                                                                  

- Immunization history:: Client reports receiving the 2nd dose of the Covid vaccine.              

- Social history:: Smoking status: Patient denies any tobacco usage or history of.                

                                                                                                  

                                                                                                  

Screenin:15 Abuse screen: Denies threats or abuse. Nutritional screening: No deficits noted.        ap3 

      Tuberculosis screening: No symptoms or risk factors identified.                             

09:16 Fall Risk None identified.                                                              ap3 

                                                                                                  

Assessment:                                                                                       

09:20 General: Appears in no apparent distress. uncomfortable, Behavior is calm, cooperative. vg1 

      Pain: Complains of pain in posterior aspect of left lateral abdomen and left lower          

      quadrant Pain currently is 10 out of 10 on a pain scale. Pain began suddenly, this          

      morning Noted to be grimacing, guarding, moaning. Neuro: Level of Consciousness is          

      awake, alert, obeys commands, Oriented to person, place, time, situation.                   

      Cardiovascular: Capillary refill < 3 seconds. Respiratory: Airway is patent Respiratory     

      effort is even, unlabored. GI: No signs and/or symptoms were reported involving the         

      gastrointestinal system. : Urine is yane blood, Reports pain in left flank(s), lower     

      quadrant(s) urgency. EENT: No signs and/or symptoms were reported regarding the EENT        

      system. Derm: Skin is intact, Skin is diaphoretic. Musculoskeletal: Circulation,            

      motion, and sensation intact.                                                               

10:36 Reassessment: Patient appears in no apparent distress at this time. Patient and/or      vg1 

      family updated on plan of care and expected duration. Pain level reassessed. Patient is     

      alert, oriented x 3, equal unlabored respirations, skin warm/dry/pink.                      

11:42 Reassessment: Patient appears in no apparent distress at this time. No changes from     vg1 

      previously documented assessment. Patient and/or family updated on plan of care and         

      expected duration. Pain level reassessed. Patient is alert, oriented x 3, equal             

      unlabored respirations, skin warm/dry/pink.                                                 

12:00 Reassessment: Pt c/o lower back pain 10/10 provider notified. received VO from [place] [id number] 

      to administer 4 mg of zofran and 4 mg of morphine.                                          

                                                                                                  

Vital Signs:                                                                                      

09:13  / 79; Pulse 90; Resp 18; Pulse Ox 100% ; Weight 86.18 kg; Height 5 ft. 6 in.     ap3 

      (167.64 cm); Pain 10/10;                                                                    

10:36  / 66; Pulse 88; Resp 15; Pulse Ox 98% on R/A;                                    vg1 

11:42  / 75; Pulse 87; Resp 15; Pulse Ox 99% on R/A;                                    vg1 

12:12  / 80; Pulse 88; Resp 16; Pulse Ox 98% on R/A;                                    vg1 

09:13 Body Mass Index 30.67 (86.18 kg, 167.64 cm)                                             ap3 

                                                                                                  

ED Course:                                                                                        

09:08 Patient arrived in ED.                                                                  am2 

09:09 Sanjiv Buckner PA is PHCP.                                                              jmm 

09:09 Ladarius Sloan MD is Attending Physician.                                              jmm 

09:14 Triage completed.                                                                       ap3 

09:16 Arm band placed on left wrist.                                                          ap3 

09:17 Makenna Fay, RN is Primary Nurse.                                                  vg1 

09:20 No provider procedures requiring assistance completed.                                  vg1 

09:31 Bed in low position. Call light in reach. Side rails up X 1. Door closed. Noise         mb7 

      minimized. Client placed on continuous cardiac and pulse oximetry monitoring. NIBP          

      monitoring applied.                                                                         

09:31 Inserted saline lock: 20 gauge in left antecubital area, using aseptic technique. Blood mb7 

      collected.                                                                                  

09:31 Lipase Sent.                                                                            mb7 

09:31 CMP Sent.                                                                               mb7 

09:31 CBC with Diff Sent.                                                                     mb7 

09:42 CT Stone Protocol In Process Unspecified.                                               EDMS

11:49 Joseph Rodriguez MD is Referral Physician.                                              jmm 

12:13 IV discontinued, intact, bleeding controlled, No redness/swelling at site. Pressure     vg1 

      dressing applied.                                                                           

                                                                                                  

Administered Medications:                                                                         

09:30 Drug: Zofran (Ondansetron) 4 mg Route: IVP; Site: left antecubital;                     vg1 

10:35 Follow up: Response: No adverse reaction                                                vg1 

09:32 Drug: morphine 4 mg Route: IVP; Infused Over: 4 mins; Site: left antecubital;           vg1 

10:35 Follow up: Response: No adverse reaction; Marked relief of symptoms; RASS: Alert and    vg1 

      Calm (0)                                                                                    

09:50 Drug: NS 0.9% 1000 ml Route: IV; Rate: 1 bolus; Site: left antecubital;                 vg1 

10:35 Follow up: IV Status: Completed infusion; IV Intake: 1000ml                             vg1 

10:31 Drug: Flomax (tamsulosin) 0.4 mg Route: PO;                                             vg1 

11:42 Follow up: Response: No adverse reaction                                                vg1 

11:42 Follow up: Response: No adverse reaction                                                jl7 

10:32 Drug: NS 0.9% 1000 ml Route: IV; Rate: 1 bolus; Site: left antecubital;                 vg1 

11:42 Follow up: IV Status: Completed infusion; IV Intake: 1000ml                             vg1 

12:02 Drug: Zofran (Ondansetron) 4 mg Route: IVP; Site: left antecubital;                     vg1 

12:12 Follow up: Response: Medication administered at discharge.                              vg1 

12:04 Drug: morphine 4 mg Route: IVP; Infused Over: 4 mins; Site: left antecubital;           vg1 

12:12 Follow up: Response: Medication administered at discharge.                              vg1 

                                                                                                  

                                                                                                  

Medication:                                                                                       

09:20 VIS not applicable for this client.                                                     vg1 

                                                                                                  

Intake:                                                                                           

10:35 IV: 1000ml; Total: 1000ml.                                                              vg1 

11:42 IV: 1000ml; Total: 2000ml.                                                              vg1 

                                                                                                  

Outcome:                                                                                          

11:49 Discharge ordered by MD.                                                                fallon 

12:12 Discharged to home via wheelchair, with family.                                         vg1 

12:12 Condition: good                                                                             

12:12 Discharge instructions given to patient, family, Instructed on discharge instructions,      

      follow up and referral plans. medication usage, Demonstrated understanding of               

      instructions, follow-up care, medications, Prescriptions given X 3.                         

12:13 Patient left the ED.                                                                    vg1 

                                                                                                  

Signatures:                                                                                       

Dispatcher [employer]                           EDMS                                                 

Sanjiv Buckner PA PA jmm Leal, Jahala, BECKIE                        RN   jl7                                                  

Lupe Steele Amanda, RN                    RN   annabella3                                                  

Makenna Fay RN                    RN   vg1                                                  

Violeta Dickinson                               mb7                                                  

                                                                                                  

**************************************************************************************************

## 2022-08-24 NOTE — RAD REPORT
EXAM DESCRIPTION:  CT - Stone Protocol - 8/24/2022 9:41 am

 

CLINICAL HISTORY:  flank pain

 

COMPARISON:  Stone Protocol dated 5/28/2021

 

TECHNIQUE:  Axial 3 mm thick images were obtained without oral or IV contrast. The field-of-view span
s the entirety of the  system including uppermost abdomen and lung bases.

 

All CT scans are performed using dose optimization technique as appropriate and may include automated
 exposure control or mA/KV adjustment according to patient size.

 

FINDINGS:  Trace bilateral pleural fluid collection new from prior imaging. No acute lung parenchymal
 process seen.

 

Mild left-sided hydronephrosis is present. The patient has a 5 mm stone at the left UVJ. The urinary 
bladder is fully contracted. This stone may have passed into the bladder lumen or is partially extend
ing into the lumen. Bilateral 2-6 mm nonobstructing calyx calculi are present. No suspicious renal ma
sses. Isodense masses and pyelonephritis are not excluded on a stone protocol CT scan. No significant
 adrenal finding.

 

Imaged portions of the liver, spleen and pancreas show no suspicious findings on non-contrast imaging
. Cholecystectomy clips are present. No biliary tree dilatation.

 

No suspicious bowel findings. Appendix is normal. No acute GI process seen.

 

No hernia, mass or bulky lymphadenopathy noted. No free air, free fluid or inflammatory stranding.

 

No significant bony abnormality. Disc and bone degenerative changes are seen.

 

IMPRESSION:  Mild left-sided hydronephrosis secondary to a 5 mm stone at the left UVJ. The position o
f the stone relative to the fully contracted bladder may indicate the stone partially extends into th
e lumen of the bladder or may have already passed into the lumen.

 

Bilateral nonobstructing 2-6 mm sized calyx calculi.

 

Isodense masses and pyelonephritis are not excluded on stone protocol technique.

## 2022-08-24 NOTE — XMS REPORT
Continuity of Care Document

                           Created on:2022



Patient:DAYDAY PETER

Sex:Male

:1965

External Reference #:[id number]





Demographics







                          Address                   [address] [place] [address]

 

                          Home Phone                [phone]

 

                          Work Phone                [phone]

 

                          Mobile Phone              [phone]

 

                          Email Address             [email or web address]

 

                          Preferred Language        English

 

                          Marital Status            Unknown

 

                          Buddhist Affiliation     Unknown

 

                          Race                      Unknown

 

                          Additional Race(s)        Unavailable



                                                    Unavailable

 

                          Ethnic Group              Unknown









Author







                          Organization              [facility]

t

 

                          Address                   [address]



                                                    [place] [address]

 

                          Phone                     [phone]









Support







                Name            Relationship    Address         Phone

 

                DANILO PETER X               [phone] [place], [place] [id number] 

 

                DANILO PETER Unavailable     [address]    [phone] [place] [id number] 









Care Team Providers







                    Name                Role                [facility] Primary Care Physician 

Unavailable

 

                    SILAS WADDELL Attending Clinician Unavailable

 

                    SILAS WADDELL Attending Clinician Unavailable

 

                    1, [facility] Attending Clinician Unavailable

 

                    Silas Waddell MD Attending Clinician [phone]

 

                    Only, [facility] Test      Attending Clinician Unavailable

 

                    Doctor Unassigned, No Name Attending Clinician Unavailable

 

                    Clark Tipton   Attending Clinician Unavailable

 

                    Clark Tipton   Admitting Clinician Unavailable









Payers







           Payer Name Policy Type Policy Number Effective Date Expiration Date S

melecio RAY              [id number]  2021            



           ADMINISTRATION                       00:00:00              

 

           [employer] [place]            [id number]            



           OF [facility]                         00:00:00              







Problems







       Condition Condition Condition Status Onset  Resolution Last   Treating Co

mments 

Source



       Name   Details Category        Date   Date   Treatment Clinician        



                                                 Date                 

 

       No known No known Disease                                           Un[facility]

rs



       active active                                                  [facility]



       problems problems                                                  [facility]







Allergies, Adverse Reactions, Alerts







       Allergy Allergy Status Severity Reaction(s) Onset  Inactive Treating Comm

ents 

Source



       Name   Type                        Date   Date   Clinician        

 

       No Known DA     Active U             2011                      [facility]



       Allergie                             2-                        [place]



       s                                  00:00:                      [place]



                                          00                          [facility]

 

       NO KNOWN Drug   Active                                           [facility]



       ALLERGIE Class                                                   [facility]



       S                                                              [facility]







Social History







           Social Habit Start Date Stop Date  Quantity   Comments   Source

 

           Exposure to                       Not sure              [facility]

 [place]



           SARS-CoV-2 (event)                                             [facility]

[facility]

 

           Sex Assigned At 1965                       [facility]



           Birth      00:00:00   00:00:00                         [facility]









                Smoking Status  Start Date      Stop Date       Source

 

                Unknown if ever smoked                                 [facility]







Medications







       Ordered Filled Start  Stop   Current Ordering Indication Dosage Frequency

 Signature

                    Comments            Components          Source



     Medication Medication Date Date Medication? Clinician                (SIG) 

          



     Name Name                                                   

 

     ibuprofen            Yes                      ibuprofen           Uni

vers



     400 mg      9-01                               400 mg           ity of



     tablet      16:48:                               tablet           [place]



               22                                 TAKE ONE           Medical



                                                  (1)            Branch



                                                  TABLET(S)           



                                                  BY MOUTH           



                                                  EVERY FOUR           



                                                  HOURS AS           



                                                  NEEDED FOR           



                                                  PAIN.           

 

     ibuprofen            Yes                      ibuprofen           Uni

vers



     400 mg      9-01                               400 mg           [facility]



     tablet      16:48:                               tablet           [place]



               22                                 TAKE ONE           Medical



                                                  (1)            Branch



                                                  TABLET(S)           



                                                  BY MOUTH           



                                                  EVERY FOUR           



                                                  HOURS AS           



                                                  NEEDED FOR           



                                                  PAIN.           

 

     ibuprofen            Yes                      ibuprofen           Uni

vers



     400 mg      9-01                               400 mg           ity of



     tablet      16:48:                               tablet           [place]



               22                                 TAKE ONE           Medical



                                                  (1)            Branch



                                                  TABLET(S)           



                                                  BY MOUTH           



                                                  EVERY FOUR           



                                                  HOURS AS           



                                                  NEEDED FOR           



                                                  PAIN.           

 

     ibuprofen            Yes                      ibuprofen           Uni

vers



     400 mg      9-01                               400 mg           ity of



     tablet      16:48:                               tablet           [place]



               22                                 TAKE ONE           Medical



                                                  (1)            Branch



                                                  TABLET(S)           



                                                  BY MOUTH           



                                                  EVERY FOUR           



                                                  HOURS AS           



                                                  NEEDED FOR           



                                                  PAIN.           

 

     ibuprofen            Yes                      ibuprofen           Uni

vers



     400 mg      9-01                               400 mg           ity of



     tablet      11:48:                               tablet           [place]



               22                                 TAKE ONE           Medical



                                                  (1)            Branch



                                                  TABLET(S)           



                                                  BY MOUTH           



                                                  EVERY FOUR           



                                                  HOURS AS           



                                                  NEEDED FOR           



                                                  PAIN.           

 

     ibuprofen            Yes                      ibuprofen           Uni

vers



     400 mg      9-01                               400 mg           ity of



     tablet      11:48:                               tablet           [place]



               22                                 TAKE ONE           Medical



                                                  (1)            Branch



                                                  TABLET(S)           



                                                  BY MOUTH           



                                                  EVERY FOUR           



                                                  HOURS AS           



                                                  NEEDED FOR           



                                                  PAIN.           

 

     ibuprofen            Yes                      ibuprofen           Uni

vers



     400 mg      9-01                               400 mg           ity of



     tablet      11:48:                               tablet           [place]



               22                                 TAKE ONE           Medical



                                                  (1)            Branch



                                                  TABLET(S)           



                                                  BY MOUTH           



                                                  EVERY FOUR           



                                                  HOURS AS           



                                                  NEEDED FOR           



                                                  PAIN.           

 

     ibuprofen            Yes                      ibuprofen           Uni

vers



     400 mg      9-01                               400 mg           ity of



     tablet      11:48:                               tablet           [place]



               22                                 TAKE ONE           Medical



                                                  (1)            Branch



                                                  TABLET(S)           



                                                  BY MOUTH           



                                                  EVERY FOUR           



                                                  HOURS AS           



                                                  NEEDED FOR           



                                                  PAIN.           

 

     losartan 25            Yes                      TAKE ONE           Un

amadeo



     mg tablet      8-27                               TABLET BY           ity o

f



               00:00:                               MOUTH           [place]



               00                                 DAILY FOR           Medical



                                                  BLOOD           Branch



                                                  PRESSURE           

 

     losartan 25            Yes                      TAKE ONE           Un

amadeo



     mg tablet      8-27                               TABLET BY           ity o

f



               00:00:                               MOUTH           [place]



               00                                 DAILY FOR           Medical



                                                  BLOOD           Branch



                                                  PRESSURE           

 

     losartan 25            Yes                      TAKE ONE           Un

amadeo



     mg tablet      8-27                               TABLET BY           ity o

f



               00:00:                               MOUTH           [place]



               00                                 DAILY FOR           Medical



                                                  BLOOD           Branch



                                                  PRESSURE           

 

     losartan 25            Yes                      TAKE ONE           Un

amadeo



     mg tablet      8-27                               TABLET BY           ity o

f



               00:00:                               MOUTH           [place]



               00                                 DAILY FOR           Medical



                                                  BLOOD           Branch



                                                  PRESSURE           

 

     losartan 25            Yes                      TAKE ONE           Un

amadeo



     mg tablet      8-27                               TABLET BY           ity o

f



               00:00:                               MOUTH           [place]



               00                                 DAILY FOR           Medical



                                                  BLOOD           Branch



                                                  PRESSURE           

 

     losartan 25            Yes                      TAKE ONE           Un

amadeo



     mg tablet      8-27                               TABLET BY           ity o

f



               00:00:                               MOUTH           [place]



               00                                 DAILY FOR           Medical



                                                  BLOOD           Branch



                                                  PRESSURE           

 

     losartan 25            Yes                      TAKE ONE           Un

amadeo



     mg tablet      8-27                               TABLET BY           ity o

f



               00:00:                               MOUTH           [place]



               00                                 DAILY FOR           Medical



                                                  BLOOD           Branch



                                                  PRESSURE           

 

     losartan 25            Yes                      TAKE ONE           Un

amadeo



     mg tablet      8-27                               TABLET BY           ity o

f



               00:00:                               MOUTH           [place]



               00                                 DAILY FOR           Medical



                                                  BLOOD           Branch



                                                  PRESSURE           

 

     ergocalcife            Yes                      TAKE ONE           Un

amadeo



     rol,      6-01                               CAPSULE BY           ity of



     vitamin d2,      00:00:                               MOUTH           [place]



     1,250 mcg      00                                 EVERY WEEK           Medi

[facility]ad[facility]



     (50,000                                         *DO NOT           Branch



     unit)                                         CRUSH*           



     capsule                                                        

 

     metformin            Yes                      TAKE ONE           Univ

ers



      mg      6-01                               TABLET BY           ity o

f



     24 hr      00:00:                               MOUTH           [place]



     tablet      00                                 DAILY FOR           Medical



                                                  DIABETES           Branch



                                                  *DO NOT           



                                                  CRUSH*           

 

     metoprolol            Yes                      TAKE ONE           Uni

vers



     succinate      6-01                               TABLET BY           ity o

f



     XL 25 mg 24      00:00:                               MOUTH           [place]



     hr tablet      00                                 DAILY FOR           Medic

al



                                                  HEART/BLOO           Branch



                                                  D              



                                                  PRESSURE.           



                                                  *DO NOT           



                                                  CRUSH*           

 

     ergocalcife            Yes                      TAKE ONE           Un

amadeo



     rol,      6-01                               CAPSULE BY           ity of



     vitamin d2,      00:00:                               MOUTH           [place]



     1,250 mcg      00                                 EVERY WEEK           Medi

amadou



     (50,000                                         *DO NOT           Branch



     unit)                                         CRUSH*           



     capsule                                                        

 

     metformin            Yes                      TAKE ONE           Univ

ers



      mg      6-01                               TABLET BY           ity o

f



     24 hr      00:00:                               MOUTH           [place]



     tablet      00                                 DAILY FOR           Medical



                                                  DIABETES           Branch



                                                  *DO NOT           



                                                  CRUSH*           

 

     metoprolol            Yes                      TAKE ONE           Uni

vers



     succinate      6-01                               TABLET BY           ity o

f



     XL 25 mg 24      00:00:                               MOUTH           [place]



     hr tablet      00                                 DAILY FOR           Medic

al



                                                  HEART/BLOO           Branch



                                                  D              



                                                  PRESSURE.           



                                                  *DO NOT           



                                                  CRUSH*           

 

     ergocalcife            Yes                      TAKE ONE           Un

amadeo



     rol,      6-01                               CAPSULE BY           ity of



     vitamin d2,      00:00:                               MOUTH           [place]



     1,250 mcg      00                                 EVERY WEEK           Medi

amad[facility]



     (50,000                                         *DO NOT           Branch



     unit)                                         CRUSH*           



     capsule                                                        

 

     metformin            Yes                      TAKE ONE           Univ

ers



      mg      6-01                               TABLET BY           ity o

f



     24 hr      00:00:                               MOUTH           [place]



     tablet      00                                 DAILY FOR           Medical



                                                  DIABETES           Branch



                                                  *DO NOT           



                                                  CRUSH*           

 

     metoprolol            Yes                      TAKE ONE           Uni

vers



     succinate      6-01                               TABLET BY           ity o

f



     XL 25 mg 24      00:00:                               MOUTH           [place]



     hr tablet      00                                 DAILY FOR           Medic

al



                                                  HEART/BLOO           Branch



                                                  D              



                                                  PRESSURE.           



                                                  *DO NOT           



                                                  CRUSH*           

 

     ergocalcife            Yes                      TAKE ONE           Un

amadeo



     rol,      6-01                               CAPSULE BY           ity of



     vitamin d2,      00:00:                               MOUTH           [place]



     1,250 mcg      00                                 EVERY WEEK           Medi

amad[facility]



     (50,000                                         *DO NOT           Branch



     unit)                                         CRUSH*           



     capsule                                                        

 

     metformin            Yes                      TAKE ONE           Univ

ers



      mg      6-01                               TABLET BY           ity o

f



     24 hr      00:00:                               MOUTH           [place]



     tablet      00                                 DAILY FOR           Medical



                                                  DIABETES           Branch



                                                  *DO NOT           



                                                  CRUSH*           

 

     metoprolol            Yes                      TAKE ONE           Uni

vers



     succinate      6-01                               TABLET BY           ity o

f



     XL 25 mg 24      00:00:                               MOUTH           [place]



     hr tablet      00                                 DAILY FOR           Medic

al



                                                  HEART/BLOO           Branch



                                                  D              



                                                  PRESSURE.           



                                                  *DO NOT           



                                                  CRUSH*           

 

     ergocalcife            Yes                      TAKE ONE           Un

amadeo



     rol,      6-01                               CAPSULE BY           ity of



     vitamin d2,      00:00:                               MOUTH           [place]



     1,250 mcg      00                                 EVERY WEEK           Medi

amadou



     (50,000                                         *DO NOT           Branch



     unit)                                         CRUSH*           



     capsule                                                        

 

     metformin            Yes                      TAKE ONE           Univ

ers



      mg      6-01                               TABLET BY           ity o

f



     24 hr      00:00:                               MOUTH           [place]



     tablet      00                                 DAILY FOR           Medical



                                                  DIABETES           Branch



                                                  *DO NOT           



                                                  CRUSH*           

 

     metoprolol            Yes                      TAKE ONE           Uni

vers



     succinate      6-01                               TABLET BY           ity o

f



     XL 25 mg 24      00:00:                               MOUTH           [place]



     hr tablet      00                                 DAILY FOR           Medic

al



                                                  HEART/BLOO           Branch



                                                  D              



                                                  PRESSURE.           



                                                  *DO NOT           



                                                  CRUSH*           

 

     ergocalcife            Yes                      TAKE ONE           Un

[facility]



     rol,      6-01                               CAPSULE BY           ity of



     vitamin d2,      00:00:                               MOUTH           [place]



     1,250 mcg      00                                 EVERY WEEK           Medi

[facility]



     (50,000                                         *DO NOT           Branch



     unit)                                         CRUSH*           



     capsule                                                        

 

     metformin            Yes                      TAKE ONE           Univ

ers



      mg      6-01                               TABLET BY           ity o

f



     24 hr      00:00:                               MOUTH           [place]



     tablet      00                                 DAILY FOR           Medical



                                                  DIABETES           Branch



                                                  *DO NOT           



                                                  CRUSH*           

 

     metoprolol            Yes                      TAKE ONE           Uni

vers



     succinate      6-01                               TABLET BY           ity o

f



     XL 25 mg 24      00:00:                               MOUTH           [place]



     hr tablet      00                                 DAILY FOR           Medic

al



                                                  HEART/BLOO           Branch



                                                  D              



                                                  PRESSURE.           



                                                  *DO NOT           



                                                  CRUSH*           

 

     ergocalcife            Yes                      TAKE ONE           Un

[facility]



     rol,      6-01                               CAPSULE BY           ity of



     vitamin d2,      00:00:                               MOUTH           [place]



     1,250 mcg      00                                 EVERY WEEK           Medi

[facility]



     (50,000                                         *DO NOT           Branch



     unit)                                         CRUSH*           



     capsule                                                        

 

     metformin            Yes                      TAKE ONE           Univ

ers



      mg      6-01                               TABLET BY           ity o

f



     24 hr      00:00:                               MOUTH           [place]



     tablet      00                                 DAILY FOR           Medical



                                                  DIABETES           Branch



                                                  *DO NOT           



                                                  CRUSH*           

 

     metoprolol            Yes                      TAKE ONE           Uni

vers



     succinate      6-01                               TABLET BY           ity o

f



     XL 25 mg 24      00:00:                               MOUTH           [place]



     hr tablet      00                                 DAILY FOR           Medic

al



                                                  HEART/BLOO           Branch



                                                  D              



                                                  PRESSURE.           



                                                  *DO NOT           



                                                  CRUSH*           

 

     ergocalcife            Yes                      TAKE ONE           Un

[facility]



     rol,      6-01                               CAPSULE BY           ity of



     vitamin d2,      00:00:                               MOUTH           [place]



     1,250 mcg      00                                 EVERY WEEK           Medi

[facility]



     (50,000                                         *DO NOT           Branch



     unit)                                         CRUSH*           



     capsule                                                        

 

     metformin            Yes                      TAKE ONE           Univ

ers



      mg      6-01                               TABLET BY           ity o

f



     24 hr      00:00:                               MOUTH           [place]



     tablet      00                                 DAILY FOR           Medical



                                                  DIABETES           Branch



                                                  *DO NOT           



                                                  CRUSH*           

 

     metoprolol            Yes                      TAKE ONE           Uni

vers



     succinate      6-01                               TABLET BY           ity o

f



     XL 25 mg 24      00:00:                               MOUTH           [place]



     hr tablet      00                                 DAILY FOR           Medic

al



                                                  HEART/BLOO           Branch



                                                  D              



                                                  PRESSURE.           



                                                  *DO NOT           



                                                  CRUSH*           

 

     allopurinoL      2020      Yes                      TAKE ONE           Un

[facility]



     100 mg      1-23                               TABLET BY           ity of



     tablet      00:00:                               MOUTH           [place]



                                                DAILY FOR           Medical



                                                  GOUT           Branch

 

     atorvastati      2020-      Yes                      TAKE           Univer

s



     n 80 mg      1-23                               ONE-HALF           ity of



     tablet      00:00:                               TABLET BY           [address]           [facility]



                                                  BEDTIME           [place]



                                                  FOR            



                                                  CHOLESTERO           



                                                  L              

 

     glipiZIDE      2020      Yes                      TAKE           [facility]



     10 mg      1-23                               ONE-HALF           ity of



     tablet      00:00:                               TABLET BY           [address]           Medical



                                                  TWICE A           Branch



                                                  DAY FOR           



                                                  DIABETES           

 

     allopurinoL      2020      Yes                      TAKE ONE           Un

[facility]



     100 mg      1-23                               TABLET BY           ity of



     tablet      00:00:                               MOUTH           [place]



                                                DAILY FOR           Medical



                                                  GOUT           Branch

 

     atorvastati      2020      Yes                      TAKE           Univer

s



     n 80 mg      1-23                               ONE-HALF           ity of



     tablet      00:00:                               TABLET BY           [address]           [facility]



                                                  BEDTIME           [place]



                                                  FOR            



                                                  CHOLESTERO           



                                                  L              

 

     glipiZIDE      2020      Yes                      TAKE           [facility]



     10 mg      1-23                               ONE-HALF           ity of



     tablet      00:00:                               TABLET BY           [address]



                                                  TWICE A           Branch



                                                  DAY FOR           



                                                  DIABETES           

 

     allopurinoL      2020      Yes                      TAKE ONE           Un

[facility]



     100 mg      1-23                               TABLET BY           ity of



     tablet      00:00:                               MOUTH           [place]



                                                DAILY FOR           Medical



                                                  GOUT           Branch

 

     atorvastati      2020      Yes                      TAKE           Univer

s



     n 80 mg      1-23                               ONE-HALF           ity of



     tablet      00:00:                               TABLET BY           [address]           [facility]



                                                  FOR            



                                                  CHOLESTERO           



                                                  L              

 

     glipiZIDE      2020      Yes                      TAKE           [facility]



     10 mg      1-23                               ONE-HALF           ity of



     tablet      00:00:                               TABLET BY           [address]



                                                  TWICE A           Branch



                                                  DAY FOR           



                                                  DIABETES           

 

     allopurinoL      2020      Yes                      TAKE ONE           Un

[facility]



     100 mg      1-23                               TABLET BY           ity of



     tablet      00:00:                               MOUTH           [place]



                                                DAILY FOR           Medical



                                                  GOUT           Branch

 

     atorvastati      -      Yes                      TAKE           Univer

s



     n 80 mg      1-23                               ONE-HALF           ity of



     tablet      00:00:                               TABLET BY           [address]           [facility]



                                                  BEDTIME           [place]



                                                  FOR            



                                                  CHOLESTERO           



                                                  L              

 

     glipiZIDE      2020-      Yes                      TAKE           [facility]



     10 mg      1-23                               ONE-HALF           ity of



     tablet      00:00:                               TABLET BY           [address]           Medical



                                                  TWICE A           Branch



                                                  DAY FOR           



                                                  DIABETES           

 

     allopurinoL      2020      Yes                      TAKE ONE           Un

[facility]



     100 mg      1-23                               TABLET BY           ity of



     tablet      00:00:                               MOUTH           [place]



                                                DAILY FOR           Medical



                                                  GOUT           Branch

 

     atorvastati      -      Yes                      TAKE           Univer

s



     n 80 mg      1-23                               ONE-HALF           ity of



     tablet      00:00:                               TABLET BY           [address]           [facility]



                                                  BEDTIME           [place]



                                                  FOR            



                                                  CHOLESTERO           



                                                  L              

 

     glipiZIDE      2020      Yes                      TAKE           [facility]



     10 mg      1-23                               ONE-HALF           ity of



     tablet      00:00:                               TABLET BY           [address]



                                                  TWICE A           Branch



                                                  DAY FOR           



                                                  DIABETES           

 

     allopurinoL      2020      Yes                      TAKE ONE           Un

am[facility]o



     100 mg      1-23                               TABLET BY           ity of



     tablet      00:00:                               MOUTH           [place]



                                                DAILY FOR           Medical



                                                  GOUT           Branch

 

     atorvastati      2020      Yes                      TAKE           Univer

s



     n 80 mg      1-23                               ONE-HALF           ity of



     tablet      00:00:                               TABLET BY           [address]           [facility]



                                                  FOR            



                                                  CHOLESTERO           



                                                  L              

 

     glipiZIDE      2020      Yes                      TAKE           [facility]



     10 mg      1-23                               ONE-HALF           ity of



     tablet      00:00:                               TABLET BY           [address]



                                                  TWICE A           Branch



                                                  DAY FOR           



                                                  DIABETES           

 

     allopurinoL      2020      Yes                      TAKE ONE           Un

[facility]



     100 mg      1-23                               TABLET BY           ity of



     tablet      00:00:                               MOUTH           [place]



                                                DAILY FOR           Medical



                                                  GOUT           Branch

 

     atorvastati      2020      Yes                      TAKE           Univer

s



     n 80 mg      1-23                               ONE-HALF           ity of



     tablet      00:00:                               TABLET BY           [address]           [facility]



                                                  FOR            



                                                  CHOLESTERO           



                                                  L              

 

     glipiZIDE      2020      Yes                      TAKE           [facility]



     10 mg      1-23                               ONE-HALF           ity of



     tablet      00:00:                               TABLET BY           [address]



                                                  TWICE A           Branch



                                                  DAY FOR           



                                                  DIABETES           

 

     allopurinoL      2020      Yes                      TAKE ONE           Un

[facility]



     100 mg      1-23                               TABLET BY           ity of



     tablet      00:00:                               MOUTH           [place]



                                                DAILY FOR           Medical



                                                  GOUT           Branch

 

     atorvastati      2020      Yes                      TAKE           Univer

s



     n 80 mg      1-23                               ONE-HALF           ity of



     tablet      00:00:                               TABLET BY           [address]           [facility]



                                                  FOR            



                                                  CHOLESTERO           



                                                  L              

 

     glipiZIDE      2020      Yes                      TAKE           [facility]



     10 mg      1-23                               ONE-HALF           ity of



     tablet      00:00:                               TABLET BY           [address]



                                                  TWICE A           Branch



                                                  DAY FOR           



                                                  DIABETES           

 

     No known                No                                      [facility]



     medications                                                        ity of



                                                                 [facility]

 

     No known                No                                      [facility]



     medications                                                        ity of



                                                                 [facility]

 

     No known                No                                      [facility]



     medications                                                        it of



                                                                 [facility]







Vital Signs







             Vital Name   Observation Time Observation Value Comments     Source

 

             Systolic blood 2021 16:46:00 119 mm[Hg]                Univer

sity of



             pressure                                            [facility]

 

             Diastolic blood 2021 16:46:00 70 mm[Hg]                 Unive

rsity of



             pressure                                            [facility]

 

             Heart rate   2021 16:46:00 83 /min                   [facility]

ty of



                                                                 [place] [facility]



                                                                 [place]

 

             Body temperature 2021 16:46:00 36.06 Johanna                 Univ

ersity of



                                                                 [place] [facility]



                                                                 [facility]

 

             Respiratory rate 2021 16:46:00 18 /min                   Univ

ersity of



                                                                 [place] [facility]



                                                                 [place]

 

             Body height  2021 16:46:00 167.6 cm                  [facility]

ty of



                                                                 [place] [facility]



                                                                 [place]

 

             Body weight  2021 16:46:00 84.505 kg                 [facility]

ty of



                                                                 [place] [facility]



                                                                 [facility]

 

             BMI          2021 16:46:00 30.07 kg/m2               Universi

ty of



                                                                 [facility]

 

             Oxygen saturation in 2021 16:46:00 97 /min                   

[facility]



             Arterial blood by                                        [place] [facility]

[facility]



             Pulse oximetry                                        Branch

 

             Systolic blood 2021 14:01:00 124 mm[Hg]                Univer

sity of



             pressure                                            [facility]

 

             Diastolic blood 2021 14:01:00 73 mm[Hg]                 Unive

rsity of



             pressure                                            [facility]

 

             Heart rate   2021 14:01:00 85 /min                   [facility]

ty of



                                                                 [place] [facility]



                                                                 [place]

 

             Body temperature 2021 14:01:00 36.28 Johanna                 Univ

ersity of



                                                                 [facility]

 

             Respiratory rate 2021 14:01:00 18 /min                   Univ

ersity of



                                                                 [place] [facility]



                                                                 [place]

 

             Body height  2021 14:01:00 167.6 cm                  [facility]

ty of



                                                                 [place] [facility]



                                                                 [place]

 

             Body weight  2021 14:01:00 83.28 kg                  [facility]

ty of



                                                                 [place] [facility]



                                                                 [place]

 

             BMI          2021 14:01:00 29.63 kg/m2               Univers[facility]

ty of



                                                                 [place] [facility]



                                                                 [place]

 

             Oxygen saturation in 2021 14:01:00 98 /min                   

[facility]



             Arterial blood by                                        [place] [facility]

[facility]



             Pulse oximetry                                        Branch







Procedures







                Procedure       Date / Time     Performing Clinician Source



                                Performed                       

 

                ASSIGNMENT OF BENEFITS 2021-10-08 14:49:44 Doctor Unassigned, No

 [facility]

 

                DME/SUPPLY JUSTIFICATION 2021 05:01:00 Doctor Unassigned, 

No [facility]

 

                VACCINATIONS - CONSENTS, 2021 05:01:00 Doctor Unassigned, 

No [facility]



                ELIGIBILITY, HISTORY                 Name            Medical Bra

[facility]







Encounters







        Start   End     Encounter Admission Attending Care    Care    Encounter 

Source



        Date/Time Date/Time Type    Type    Clinicians Facility Department ID   

   

 

        2021 Outpatient R       SILAS WADDELL [facility] 

   [id number] 

[facility]



        10:20:00 10:20:00                 SILAS WADDELL                 

17  ity [facility]

 

        2021 Outpatient R       SILAS WADDELL [facility] 

   [id number] 

[facility]



        10:20:00 10:20:00                 [facility]

 

        2021-10-11 2021-10-11 Outpatient R               [facility]    [id number]

-20 Univers



        19:30:00 19:30:00                                         [id number]  ity [facility]

 

        2021-10-11 2021-10-11 Outpatient R       SILAS WADDELL [facility] 

   [id number] 

[facility]



        19:30:00 19:30:00                 SILAS WADDELL                     

    [facility]

 

        2021-10-11 2021-10-11 Technician         1, [facility] Sleep Lab Bed [facility]    1.

[phone] [id number]

                                        [facility]



        14:11:49 16:41:49 Visit           Silas Waddell [id number].

10         ity of



                                                Louise [id number]         [facility]  [phone]         Medi

amadou



                                                        193             Branch

 

        2021-10-08 2021-10-08 Laboratory         Only, [facility] Test [facility]    1.2.840.

114 [id number] 

Univers



        09:55:41 10:10:41 Only            Silas Waddell 350.1.13.

10         ity of



                                                Louise 4.2.7.2.686         [facility]  [phone]         Medi

amadou



                                                        353             Branch

 

        2021-10-08 2021-10-08 Outpatient R               [facility]    [id number]

-20 Univers



        10:00:00 10:00:00                                         [id number]  [facility]

 

        2021-10-08 2021-10-08 Outpatient R               [facility]    [id number]

[id number] Univers



        10:00:00 10:00:00                                                 [facility]

 

        2021-10-08 2021-10-08 Orders          Doctor SAUER    1.2.840.114 [id number]

41 Univers



        00:00:00 00:00:00 Only            Unassigned, EVELIO   350.1.13.10       

  ity of



                                        [place] [facility] 4.2.7.2.686         Greg

as



                                                        [phone]         [address]

 

        2021 Orders          Doctor  CRISTIANE    1.2.840.114 [id number]

30 Univers



        00:00:00 00:00:00 Only            Unassigned, EVELIO   350.1.13.10       

  ity of



                                        [place] 4.2.7.2.686         Greg

as



                                                        [phone]         [address]

 

        2021 Office          Beena [facility]    [id number] [id number]

0174 Univers



        11:36:32 11:56:32 Visit           Silas Kenny 350.1.13.10        

 ity [facility] 4.2.7.2.686         [place]

[place]



                                                [place] [phone]         Me

dical



                                                Critical access hospital5             [facility]                 

 

        2021 Outpatient R       SILAS WADDELL [facility] 

   [id number] 

Univers



        11:40:00 11:40:00                 SILAS WADDELL                 [address]  [facility]

 

        2021 Outpatient R       SILAS WADDELL [facility] 

   [id number] 

Univers



        11:40:00 11:40:00                 SILAS WADEDLL                     

    [facility]

 

        2021 Outpatient R               [facility]    [id number]

-20 Univers



        19:30:00 19:30:00                                         [id number]  [facility]

 

        2021 Outpatient R       SILAS WADDELL [facility] 

   [id number] 

Univers



        19:30:00 19:30:00                 SILAS WADDELL                     

    [facility]

 

        2021 Technician         1, [facility] Sleep Lab Bed [facility]    1.

2.840.114 [id number]

                                        Univers



        15:15:54 17:45:54 Visit           Silas Waddell 350.1.13.

10         ity of



                                                [place] 4.2.7.2.686         [place]

[place]



                                                Lyle  [phone]         Medi

amadou



                                                        193             Branch

 

        2021 Orders          Doctor  CRISTIANE    1.2.840.114 [id number]

56 Univers



        00:00:00 00:00:00 Only            Unassigned, EVELIO   350.1.13.10       

  ity of



                                        [place] [facility] [id number]         Greg

as



                                                        [phone]         Medi

amadou



                                                        009             [place]

 

        2021 Office          Beena [facility]    [id number]

3268 Univers



        08:48:47 09:42:06 Visit           Silas Kenny 350.1.13.10        

 ity of



                                                [place] 4.2.7.2.686         [place]

[place]



                                                [facility] [phone]         Me

dical



                                                nal     085             [facility]                 

 

        2021 Outpatient R       SILAS WADDELL [facility] 

   [id number] 

Univers



        09:30:00 09:30:00                 SILAS WADDELL [facility]

 

        2019 Inpatient ARLEY Tipton  [facility].01 [id number]

70 [facility]



        16:29:00 22:36:00                 Clark                 [address] [place] [address]







Results







           Test Description Test Time  Test Comments Results    Result Comments 

Source

 

           SURG       2019            ---------------------            



                      12:56:00              ---------------------            



                                            ---------------------            



                                            ---------------------            



                                            --------RUN DATE:            



                                            19 [facility] - LAB            



                                            *LIVE* PAGE 1 RUN            



                                            TIME: 1256 Specimen            



                                            Inquiry RUN USER:            



                                            INTERFACE             



                                            ---------------------            



                                            ---------------------            



                                            ---------------------            



                                            ---------------------            



                                            --------PATIENT:            



                                            DAYDAY PETER            



                                            ACCT #: [id number]            



                                            LOC: L.2S U #:            



                                            [id number] AGE/SX:            



                                            54/M ROOM: \[facility] [place] [facility]\""            



                                            RE19[facility] DR:            



                                            Clark Tipton MD            



                                            : 63 BED: 1            



                                            DIS: 19 STATUS:            



                                            DIS Александр TLOC:            



                                            ---------------------            



                                            ---------------------            



                                            ---------------------            



                                            ---------------------            



                                            -------- SPEC #:            



                                            PMC:[id number] RECD:            



                                             STATUS:            



                                            BEBO REQ #: [id number]            



                                            MOY:             



                                            SUBM DR:              



                                            Clark Tipton MD            



                                            ENTERED:              



                                             SP            



                                            TYPE: SURG OTHR DR:            



                                            No Primary or Family            



                                            Physician Self            



                                            Referred  Issac Bailon MDORDERED: SURG PATH            



                                            LVL 3 COPIES TO: No            



                                            Primary or Family            



                                            Physician Self            



                                            Referred              



                                            Clark Tipton MD            



                                            [address] [address]            



                                            [place] [address] [phone]            



                                            [email or web address]            



                                            Issac Cook MD            



                                            [address]            



                                            [address] [place] [address]            



                                            [phone]            



                                            HISTOLOGY: TISSUE ID            



                                            BLK PCS ABIGAIL LEV            



                                            PROCEDURE DISPOSITION            



                                            _______________ ____            



                                            ______ ___ ___ ___            



                                            _______________            



                                            ___________            



                                            GALLBLADDER, NO A 1-2            



                                            1  PROCEDURES: SURG            



                                            PATH LVL 3            



                                            ()            



                                            TISSUES: A.            



                                            GALLBLADDER, NOS -            



                                            GALLBLADDER CLINICAL            



                                            HISTORY ABD PAIN CPT            



                                            CODES CPT CODE(S):            



                                            16833 , , , , , , **            



                                            CONTINUED ON NEXT            



                                            PAGE **               



                                            ---------------------            



                                            ---------------------            



                                            ---------------------            



                                            ---------------------            



                                            --------RUN DATE:            



                                            19 [facility] - LAB            



                                            *LIVE* PAGE 2 RUN            



                                            TIME: 1256 Specimen            



                                            Inquiry  RUN USER:            



                                            INTERFACE             



                                            ---------------------            



                                            ---------------------            



                                            ---------------------            



                                            ---------------------            



                                            --------SPEC #:            



                                            [facility]:[id number] PATIENT:            



                                            DAYDAY PETER            



                                            #[id number]            



                                            (Continued)----------            



                                            ---------------------            



                                            ---------------------            



                                            ---------------------            



                                            -------------------            



                                            FINAL DIAGNOSIS            



                                            Gallbladder,            



                                            laparoscopic            



                                            cholecystectomy: MILD            



                                            CHRONIC CHOLECYSTITIS            



                                            WITH CHOLELITHIASIS            



                                            GROSS DESCRIPTION            



                                            Gallbladder. Received            



                                            in formalin is a            



                                            collapsed             



                                            gallbladder, 8.5 x            



                                            2.7 x 2.0 cm with a            



                                            wall, 0.3 cm in            



                                            average thickness and            



                                            a defect in the body            



                                            extending to the            



                                            gallbladder neck, 3.0            



                                            cm. The mucosa is            



                                            brown-green, smooth            



                                            and velvety. The            



                                            lumen contains scanty            



                                            bile and numerous            



                                            black friable            



                                            calculi, 2.0 x 1.7 x            



                                            0.3 cm in aggregate.            



                                            Five additional            



                                            similar calculi are            



                                            present in the            



                                            container measuring            



                                            1.0 x 0.7 x 0.3 cm in            



                                            aggregate.            



                                            Representative            



                                            section submitted as            



                                            A. /ba/pdb Grossing            



                                            performed at [facility]            



                                            Pathology, [address]            



                                            [facility], [address],            



                                            [place] [address].            



                                            Medical Director:            



                                            Champ Dooley M.D.            



                                            MICROSCOPIC            



                                            DESCRIPTION            



                                            Gallbladder. Sections            



                                            demonstrate            



                                            gallbladder wall with            



                                            glandular mucosa.            



                                            Focal dilated            



                                            Rokitansky-Aschoff            



                                            sinuses are            



                                            identified. There is            



                                            mild patchy chronic            



                                            inflammation in the            



                                            wall of the            



                                            gallbladder. There is            



                                            no evidence of            



                                            dysplasia or            



                                            malignancy.            



                                            /cm------------------            



                                            ---------------------            



                                            ---------------------            



                                            ---------------------            



                                            ----------- Signed            



                                            SIGNATURE ON FILE            



                                            Marry Freeman            



                                            19 1256            



                                            ---------------------            



                                            ---------------------            



                                            ---------------------            



                                            ---------------------            



                                            --------  ** END OF            



                                            REPORT **             









                    COMPREHENSIVE METABOLIC PANEL 2019 18:11:00 









                      Test Item  Value      Reference Range Interpretation Comme

nts









             SODIUM (test code = NA) 137 mmol/L   134-147      N            

 

             POTASSIUM (test code = K) 4.4 mmol/L   3.4-5.0      N            

 

             CHLORIDE (test code = CL) 107 mmol/L   100-108      N            

 

             CARBON DIOXIDE (test code = CO2) 23 mmol/L    21-32        N       

     

 

             ANION GAP (test code = GAP) 7.0 GAP calc 4.0-15.0     N            

 

             GLUCOSE (test code = GLU) 239 MG/DL           H            

 

             BLOOD UREA NITROGEN (test code = BUN) 10 MG/DL     7-18         N  

          

 

             GLOMERULAR FILTRATION RATE (test code = GFR) >=60 max estimate estG

FR >60                       

 

             CREATININE (test code = CREAT) 1.3 MG/DL    0.8-1.3      N         

   

 

             TOTAL PROTEIN (test code = PROT) 7.1 G/DL     6.4-8.2      N       

     

 

             ALBUMIN (test code = ALB) 3.2 G/DL     3.4-5.0      L            

 

             GLOBULIN (test code = GLOB) 3.9 GM/dL                              

 

             ALBUMIN/GLOBULIN RATIO (test code = A/G) 0.8 RATIO    1.2-2.2      

L            

 

             CALCIUM (test code = CA) 7.9 MG/DL    8.5-10.1     L            

 

             BILIRUBIN TOTAL (test code = BILT) 1.20 MG/DL   0.2-1.2      N     

       

 

             SGOT/AST (test code = AST) 91 Unit/L    15-37        H            

 

             SGPT/ALT (test code = ALT) 139 Unit/L   12-78        H            

 

             ALKALINE PHOSPHATASE TOTAL (test code = ALKP) 81 Unit/L      

     N            



GLUCOSE BEDSIDE [id number] 16:44:00





             Test Item    Value        Reference Range Interpretation Comments

 

             GLUCOSE BEDSIDE TESTING (test code 147 mg/dL           H     

       



             = GLUBED)                                           



CBC W/AUTO [id number] 16:07:00





             Test Item    Value        Reference Range Interpretation Comments

 

             WHITE BLOOD CELL (test code = 8.3 K/mm3    3.5-11.0     N          

  



             WBC)                                                

 

             RED BLOOD CELL (test code = RBC) 4.92 M/mm3   4.70-6.10    N       

     

 

             HEMOGLOBIN (test code = HGB) 14.0 G/DL    12.3-15.9    N           

 

 

             HEMATOCRIT (test code = HCT) 41.5 %       35.8-46.7    N           

 

 

             MEAN CELL VOLUME (test code = 84.3 Fl      86.3-98.9    L          

  



             MCV)                                                

 

             MEAN CELL HGB (test code = MCH) 28.5 pg      28.9-34.4    L        

    

 

             MEAN CELL HGB CONCETRATION (test 33.7 G/DL    32.1-34.5    N       

     



             code = MCHC)                                        

 

             RED CELL DISTRIBUTION WIDTH (test 13.2 SD      11.5-14.5    N      

      



             code = RDW)                                         

 

             PLATELET COUNT (test code = PLT) 253.0 K/mm3  150-450      N       

     

 

             MEAN PLATELET VOLUME (test code = 10.30 fL     7.0-9.6      H      

      



             MPV)                                                

 

             NEUTROPHIL % (test code = NT%) 67.6 %       40-76                  

   

 

             LYMPHOCYTE % (test code = LY%) 21.1 %       20.5-51.1    N         

   

 

             MONOCYTE % (test code = MO%) 8.0 %        1.7-9.3      N           

 

 

             EOSINOPHIL % (test code = EO%) 2.7 %        0.0-6.0      N         

   

 

             BASOPHIL % (test code = BA%) 0.6 %        0.0-2.0      N           

 

 

             NEUTROPHIL # (test code = NT#) 5.59 K/mm3   1.8-7.6      N         

   

 

             LYMPHOCYTE # (test code = LY#) 1.7 K/mm3    0.6-3.0      N         

   

 

             MONOCYTE # (test code = MO#) 0.7 K/mm3    0.2-1.5      N           

 

 

             EOSINOPHIL # (test code = EO#) 0.2 K/mm3    0.0-0.4      N         

   

 

             BASOPHIL # (test code = BA#) 0.1 K/mm3    0.0-0.2      N           

 

 

             MANUAL DIFF REQUIRED (test code = NO DIFF/SCN  CRITERIA            

      



             MDIFF)                                              



GLUCOSE BEDSIDE [id number] 13:00:00





             Test Item    Value        Reference Range Interpretation Comments

 

             GLUCOSE BEDSIDE TESTING (test code 143 mg/dL           H     

       



             = GLUBED)                                           



GLUCOSE BEDSIDE [id number] 10:06:00





             Test Item    Value        Reference Range Interpretation Comments

 

             GLUCOSE BEDSIDE TESTING (test code 113 mg/dL           H     

       



             = GLUBED)                                           



GLUCOSE BEDSIDE [id number] 07:51:00





             Test Item    Value        Reference Range Interpretation Comments

 

             GLUCOSE BEDSIDE TESTING (test code 131 mg/dL           H     

       



             = GLUBED)                                           



COMPREHENSIVE METABOLIC [id number] 07:11:00





             Test Item    Value        Reference Range Interpretation Comments

 

             SODIUM (test code = NA) 141 mmol/L   134-147      N            

 

             POTASSIUM (test code = 3.9 mmol/L   3.4-5.0      N            



             K)                                                  

 

             CHLORIDE (test code = 110 mmol/L   100-108      H            



             CL)                                                 

 

             CARBON DIOXIDE (test 25 mmol/L    21-32        N            



             code = CO2)                                         

 

             ANION GAP (test code = 6.0 GAP calc 4.0-15.0     N            



             GAP)                                                

 

             GLUCOSE (test code = 123 MG/DL           H            



             GLU)                                                

 

             BLOOD UREA NITROGEN 10 MG/DL     7-18         N            



             (test code = BUN)                                        

 

             GLOMERULAR FILTRATION >=60 max estimate >60                       



             RATE (test code = GFR) estGFR                                 

 

             CREATININE (test code = 1.3 MG/DL    0.8-1.3      N            



             CREAT)                                              

 

             TOTAL PROTEIN (test code 7.3 G/DL     6.4-8.2      N            



             = PROT)                                             

 

             ALBUMIN (test code = 3.5 G/DL     3.4-5.0      N            



             ALB)                                                

 

             GLOBULIN (test code = 3.8 GM/dL                              



             GLOB)                                               

 

             ALBUMIN/GLOBULIN RATIO 0.9 RATIO    1.2-2.2      L            



             (test code = A/G)                                        

 

             CALCIUM (test code = CA) 8.3 MG/DL    8.5-10.1     L            

 

             BILIRUBIN TOTAL (test 1.40 MG/DL   0.2-1.2      H            



             code = BILT)                                        

 

             SGOT/AST (test code = 102 Unit/L   15-37        H            



             AST)                                                

 

             SGPT/ALT (test code = 151 Unit/L   12-78        H            



             ALT)                                                

 

             ALKALINE PHOSPHATASE 99 Unit/L           N            



             TOTAL (test code = ALKP)                                        



COMPREHENSIVE METABOLIC [id number] 06:59:00





             Test Item    Value        Reference Range Interpretation Comments

 

             SODIUM (test code = NA) 141 mmol/L   134-147      N            

 

             POTASSIUM (test code = K) 3.9 mmol/L   3.4-5.0      N            

 

             CHLORIDE (test code = CL) 110 mmol/L   100-108      H            

 

             CARBON DIOXIDE (test code = CO2) 25 mmol/L    21-32        N       

     

 

             ANION GAP (test code = GAP) 6.0 GAP calc 4.0-15.0     N            

 

             GLUCOSE (test code = GLU) 123 MG/DL           H            

 

             BLOOD UREA NITROGEN (test code = 10 MG/DL     7-18         N       

     



             BUN)                                                

 

             GLOMERULAR FILTRATION RATE (test  estGFR      >60                  

     



             code = GFR)                                         

 

             CREATININE (test code = CREAT)  MG/DL       0.8-1.3                

   

 

             TOTAL PROTEIN (test code = PROT)  G/DL        6.4-8.2              

     

 

             ALBUMIN (test code = ALB)  G/DL        3.4-5.0                   

 

             GLOBULIN (test code = GLOB)  GM/dL                                 

 

             ALBUMIN/GLOBULIN RATIO (test  RATIO       1.2-2.2                  

 



             code = A/G)                                         

 

             CALCIUM (test code = CA) 8.3 MG/DL    8.5-10.1     L            

 

             BILIRUBIN TOTAL (test code =  MG/DL       0.2-1.2                  

 



             BILT)                                               

 

             SGOT/AST (test code = AST)  Unit/L      15-37                     

 

             SGPT/ALT (test code = ALT)  Unit/L      12-78                     

 

             ALKALINE PHOSPHATASE TOTAL (test  Unit/L                     

     



             code = ALKP)                                        



CBC W/AUTO [id number] 06:53:00





             Test Item    Value        Reference Range Interpretation Comments

 

             WHITE BLOOD CELL (test code = 8.3 K/mm3    3.5-11.0     N          

  



             WBC)                                                

 

             RED BLOOD CELL (test code = RBC) 4.92 M/mm3   4.70-6.10    N       

     

 

             HEMOGLOBIN (test code = HGB) 14.0 G/DL    12.3-15.9    N           

 

 

             HEMATOCRIT (test code = HCT) 41.5 %       35.8-46.7    N           

 

 

             MEAN CELL VOLUME (test code = 84.3 Fl      86.3-98.9    L          

  



             MCV)                                                

 

             MEAN CELL HGB (test code = MCH) 28.5 pg      28.9-34.4    L        

    

 

             MEAN CELL HGB CONCETRATION (test 33.7 G/DL    32.1-34.5    N       

     



             code = MCHC)                                        

 

             RED CELL DISTRIBUTION WIDTH (test 13.2 SD      11.5-14.5    N      

      



             code = RDW)                                         

 

             PLATELET COUNT (test code = PLT) 253.0 K/mm3  150-450      N       

     

 

             MEAN PLATELET VOLUME (test code = 10.30 fL     7.0-9.6      H      

      



             MPV)                                                

 

             NEUTROPHIL % (test code = NT%) 67.6 %       40-76                  

   

 

             LYMPHOCYTE % (test code = LY%) 21.1 %       20.5-51.1    N         

   

 

             MONOCYTE % (test code = MO%) 8.0 %        1.7-9.3      N           

 

 

             EOSINOPHIL % (test code = EO%) 2.7 %        0.0-6.0      N         

   

 

             BASOPHIL % (test code = BA%) 0.6 %        0.0-2.0      N           

 

 

             NEUTROPHIL # (test code = NT#) 5.59 K/mm3   1.8-7.6      N         

   

 

             LYMPHOCYTE # (test code = LY#) 1.7 K/mm3    0.6-3.0      N         

   

 

             MONOCYTE # (test code = MO#) 0.7 K/mm3    0.2-1.5      N           

 

 

             EOSINOPHIL # (test code = EO#) 0.2 K/mm3    0.0-0.4      N         

   

 

             BASOPHIL # (test code = BA#) 0.1 K/mm3    0.0-0.2      N           

 

 

             MANUAL DIFF REQUIRED (test code =  DIFF/SCN    CRITERIA            

      



             MDIFF)                                              



- XR CHEST 1 -06-12 23:12:00 Name: DAYDAY PETER [facility]  : 
1965 Age/S: 54 / M [id number] Unit #: [id number] Loc: [place]
 [address] Phys: Clark Tipton MD  Acct: [id number] Dis Date: Status: ADMIN 
PHONE #: [phone] Exam Date: 2019 2300 FAX #: Reason: FOR SURGERY IN 
AM. EXAMS: CPT: [id number] XR CHEST 1 V  94245 Fluoro Time: DAP (Gy m2): Air 
Kerma (mGy): HISTORY: Chest pain. Location:C3 COMPARISON:2012 FINDINGS: 
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 There is mild cardiomegaly. No 
vascular congestion. The lungs are clear of focal consolidation. No effusion,  
pneumothorax,or acute osseous abnormality. IMPRESSION: 1. Heart size is upper 
normal. No focal consolidation. ** Electronically Signed by TIMOTEO Reyes 
** ** on 2019 at 2312 ** Reported and signed by: Jaison Reyes M.D.  CC:
 Clark Tipton MD PAGE 1 Signed Report Name: DAYDAY PETER
and : 1965 Age/S: 54 / M [id number] Unit #: [id number] Loc: 
[place] [address] Phys: Clark Tipton MD Acct: [id number] Dis Date: 
Status: ADM IN  PHONE #: [phone] Exam Date: 2019 2300 FAX #: Reason: 
FOR SURGERY IN AM. EXAMS:  CPT: [id number] XR CHEST 1 V 60889 Fluoro Time: DAP 
(Gy m2): Air Kerma (mGy):  (Continued) Technologist: Kelli Beck, RT(R)(CT) 
Trnscb Date/Time: 2019 (2312) t.SDR.[email or web address] Orig Print D/T: S: 2019 
(2315)  PAGE 2 Signed Report- CT ABD PELVIS [id number] 19:50:00 Name: 
DAYDAY PETER : 1965 Age/S: 54 / M [address] Unit #: [id number] Loc: [place] [address] Phys: Clark Tipton MD  
Acct: [id number] Dis Date: Status: ADM IN PHONE #: [phone] Exam Date: 
2019 FAX #:  Reason: Abd pain EXAMS: CPT: [id number] CTABD PELVIS 
W/CONT 52663  CT ABDOMEN AND PELVIS WITH IV CONTRAST HISTORY: Abd pain 
COMPARISON: MRI abdomen 12. TECHNIQUE: Axial CT images of the abdomen and 
pelvis were obtained with coronal and/or sagittal reformatted views. Automated 
exposure control, iterative reconstruction technique, and/or adjustment of mA 
and/or kV according to patient's size was utilized for radiation dose reduction.
 IV CONTRAST: 100 ml Isovue-300. PO CONTRAST: None. Location: B2. FINDINGS:  
Mild atelectasis present in both lung bases. The heart size is normal. Coronary 
artery calcifications. Epicardial pacemaker leads. Sternotomy wires. The 
gallbladder is distended. There are few small gallstones in the neck of the gall
bladder. There may be a small amount of pericholecystic fluid and minimal 
inflammatory changes. Milddiffuse  low attenuation seen throughout the liver 
suggestive of cyst steatosis. No focal hepatic lesion. Spleen, pancreas and 
adrenal glands are unremarkable. Both kidneys are similar in size, shape and 
enhancement without evidence of hydronephrosis. There are at least 3 
nonobstructing right renal stones measuring up to 3 mm. No definite left renal 
stone.  Urinary bladder is partially distended without wall thickening. The 
prostate is normal in size. No free air, free fluid or evidence of a bowel ob
struction. Normal appendix. No bowel wall thickening. The aorta is normal in 
caliber with mild to moderate atherosclerotic disease. No abdominal or pelvic 
adenopathy. Mild lumbar spondylosis. IMPRESSION: PAGE 1 Signed Report 
(CONTINUED) Name: DAYDAY PETER DERICK [facility] : 1965 Age/S: 54/ M
 [id number] Unit #: [id number] Loc: [place] [address] Phys: 
Clark Tipton MD Acct: [id number] Dis Date: Status: ADM IN PHONE #: 
[phone] Exam Date: 2019  FAX #: Reason: Abd pain EXAMS: CPT: 
[id number] CT ABD PELVIS W/CONT 54381 (Continued) Cholelithiasis. Minimal 
inflammatory changes and probable pericholecystic fluid concerning for acute 
cholecystitis. A right upper quadrant ultrasound may be helpful for further 
assessment. Normal appendix. Multiple nonobstructing right renal stones.  ** 
Electronically Signed by TIMOTEO Angelo ** ** on 2019 at 1950 ** 
Reported and signed by: Darshan Angelo M.D.  CC: Clark Tipton MD 
Technologist:Edmundo Nava, RT(R)(CT); .. CTDI: DLP: Trnscb Date/Time: 
2019 () t.[email or web address].SP17 Orig Print D/T: S: 2019 () PAGE 2  Signed
 ReportLACTIC [id number] 18:23:00





             Test Item    Value        Reference Range Interpretation Comments

 

             LACTIC ACID (test code = LACT) 1.4 mmol/L   0.4-2.0      N         

   



COMPREHENSIVE METABOLIC [id number] 18:09:00





             Test Item    Value        Reference Range Interpretation Comments

 

             SODIUM (test code = NA) 141 mmol/L   134-147      N            

 

             POTASSIUM (test code = 3.9 mmol/L   3.4-5.0      N            



             K)                                                  

 

             CHLORIDE (test code = 110 mmol/L   100-108      H            



             CL)                                                 

 

             CARBON DIOXIDE (test 26 mmol/L    21-32        N            



             code = CO2)                                         

 

             ANION GAP (test code = 5.0 GAP calc 4.0-15.0     N            



             GAP)                                                

 

             GLUCOSE (test code = 116 MG/DL           H            



             GLU)                                                

 

             BLOOD UREA NITROGEN 13 MG/DL     7-18         N            



             (test code = BUN)                                        

 

             GLOMERULAR FILTRATION >=60 max estimate >60                       



             RATE (test code = GFR) estGFR                                 

 

             CREATININE (test code = 1.3 MG/DL    0.8-1.3      N            



             CREAT)                                              

 

             TOTAL PROTEIN (test code 7.4 G/DL     6.4-8.2      N            



             = PROT)                                             

 

             ALBUMIN (test code = 3.6 G/DL     3.4-5.0      N            



             ALB)                                                

 

             GLOBULIN (test code = 3.8 GM/dL                              



             GLOB)                                               

 

             ALBUMIN/GLOBULIN RATIO 1.0 RATIO    1.2-2.2      L            



             (test code = A/G)                                        

 

             CALCIUM (test code = CA) 8.4 MG/DL    8.5-10.1     L            

 

             BILIRUBIN TOTAL (test 1.70 MG/DL   0.2-1.2      H            



             code = BILT)                                        

 

             SGOT/AST (test code = 130 Unit/L   15-37        H            



             AST)                                                

 

             SGPT/ALT (test code = 102 Unit/L   12-78        H            



             ALT)                                                

 

             ALKALINE PHOSPHATASE 76 Unit/L           N            



             TOTAL (test code = ALKP)                                        



LIPID PROFILE (CORONARY RISK)2019 18:09:00





             Test Item    Value        Reference Range Interpretation Comments

 

             TRIGLYCERIDES (test code = TRIG) 93 MG/DL     0-150        N       

     

 

             CHOLESTEROL (test code = CHOL) 140 MG/DL    133-200      N         

   

 

             CHOLESTEROL/HDL RATIO (test code = 2.50 RATIO   >0                 

       



             CHOLHDL)                                            

 

             HDL CHOLESTEROL (test code = HDL) 56 MG/DL     40-59        N      

      

 

             NON-HDL CHOLESTEROL (test code = 84 mg/dL     <130                 

     



             NHDL)                                               

 

             LIPOPROTEIN LDL (test code = LDL) 69 MG/DL     0-129        N      

      

 

             LDL/HDL (test code = LDL/HDL) 1.23 Ratio   1.48-3.22 Avg L         

   



[id number] 18:09:00





             Test Item    Value        Reference Range Interpretation Comments

 

             PHOSPHOROUS (test code = PHOS) 2.5 MG/DL    2.5-4.9      N         

   



RULE OUT MI [id number] 18:09:00





             Test Item    Value        Reference Range Interpretation Comments

 

             CREATINE KINASE 179 Unit/L          N            



             (CK) (test code =                                        



             CK)                                                 

 

             TROPONIN-I (test < 0.015 NG/ML 0.000-0.045  N            Negative: 

</= 0.045



             code = TROPI)                                        Positive: >/= 

0.046



                                                                 Correlation wit

h



                                                                 serial results,

 other



                                                                 cardiac markers

, and



                                                                 clinical findin

gs is



                                                                 necessary to de

termine



                                                                 the clinical



                                                                 significance of

 this



                                                                 result. Quantit

ative



                                                                 results using



                                                                 different



                                                                 methodologies s

hould



                                                                 not be compared

 to one



                                                                 another as nume

rical



                                                                 results may promise

yby



                                                                 method.



[id number] 18:09:00





             Test Item    Value        Reference Range Interpretation Comments

 

             LIPASE (test code = LIP) 457 Unit/L   114-286      H            



[id number] 18:09:00





             Test Item    Value        Reference Range Interpretation Comments

 

             MAGNESIUM (test code = MAG) 2.0 MG/DL    1.8-2.4      N            



COMPREHENSIVE METABOLIC [id number] 18:05:00





             Test Item    Value        Reference Range Interpretation Comments

 

             SODIUM (test code = NA) 141 mmol/L   134-147      N            

 

             POTASSIUM (test code = K) 3.9 mmol/L   3.4-5.0      N            

 

             CHLORIDE (test code = CL) 110 mmol/L   100-108      H            

 

             CARBON DIOXIDE (test code = CO2) 26 mmol/L    21-32        N       

     

 

             ANION GAP (test code = GAP) 5.0 GAP calc 4.0-15.0     N            

 

             GLUCOSE (test code = GLU) 116 MG/DL           H            

 

             BLOOD UREA NITROGEN (test code = 13 MG/DL     7-18         N       

     



             BUN)                                                

 

             GLOMERULAR FILTRATION RATE (test  estGFR      >60                  

     



             code = GFR)                                         

 

             CREATININE (test code = CREAT)  MG/DL       0.8-1.3                

   

 

             TOTAL PROTEIN (test code = PROT)  G/DL        6.4-8.2              

     

 

             ALBUMIN (test code = ALB)  G/DL        3.4-5.0                   

 

             GLOBULIN (test code = GLOB)  GM/dL                                 

 

             ALBUMIN/GLOBULIN RATIO (test  RATIO       1.2-2.2                  

 



             code = A/G)                                         

 

             CALCIUM (test code = CA) 8.4 MG/DL    8.5-10.1     L            

 

             BILIRUBIN TOTAL (test code =  MG/DL       0.2-1.2                  

 



             BILT)                                               

 

             SGOT/AST (test code = AST)  Unit/L      15-37                     

 

             SGPT/ALT (test code = ALT)  Unit/L      12-78                     

 

             ALKALINE PHOSPHATASE TOTAL (test  Unit/L                     

     



             code = ALKP)                                        



LIPID PROFILE (CORONARY RISK)2019 18:05:00





             Test Item    Value        Reference Range Interpretation Comments

 

             TRIGLYCERIDES (test code = TRIG)  MG/DL       0-150                

     

 

             CHOLESTEROL (test code = CHOL)  MG/DL       133-200                

   

 

             CHOLESTEROL/HDL RATIO (test code =  RATIO       >0                 

       



             CHOLHDL)                                            

 

             HDL CHOLESTEROL (test code = HDL)  MG/DL       40-59               

      

 

             NON-HDL CHOLESTEROL (test code = NHDL)  mg/dL       <130           

           

 

             LIPOPROTEIN LDL (test code = LDL)  MG/DL       0-129               

      

 

             LDL/HDL (test code = LDL/HDL)  Ratio       1.48-3.22 Avg           

   



[id number] 18:05:00





             Test Item    Value        Reference Range Interpretation Comments

 

             PHOSPHOROUS (test code = PHOS)  MG/DL       2.5-4.9                

   



RULE OUT MI [id number] 18:05:00





             Test Item    Value        Reference Range Interpretation Comments

 

             CREATINE KINASE (CK) (test code = CK)  Unit/L                

          

 

             TROPONIN-I (test code = TROPI)  NG/ML       0.000-0.045            

   



[id number] 18:05:00





             Test Item    Value        Reference Range Interpretation Comments

 

             LIPASE (test code = LIP)  Unit/L      114-286                   



[id number] 18:05:00





             Test Item    Value        Reference Range Interpretation Comments

 

             MAGNESIUM (test code = MAG)  MG/DL       1.8-2.4                   



PROTHROMBIN [id number] 17:54:00





             Test Item    Value        Reference Range Interpretation Comments

 

             PT PATIENT (test code = PTP) 11.2 SECONDS 9.3-12.9     N           

 

 

             INTERNATIONAL NORMAL RATIO 0.97 INR Unit 0.8-1.2      N            



             (test code = INR)                                        



CBC W/AUTO [id number] 17:48:00





             Test Item    Value        Reference Range Interpretation Comments

 

             WHITE BLOOD CELL (test code = 9.0 K/mm3    3.5-11.0     N          

  



             WBC)                                                

 

             RED BLOOD CELL (test code = RBC) 5.10 M/mm3   4.70-6.10    N       

     

 

             HEMOGLOBIN (test code = HGB) 14.5 G/DL    12.3-15.9    N           

 

 

             HEMATOCRIT (test code = HCT) 43.4 %       35.8-46.7    N           

 

 

             MEAN CELL VOLUME (test code = 85.1 Fl      86.3-98.9    L          

  



             MCV)                                                

 

             MEAN CELL HGB (test code = MCH) 28.4 pg      28.9-34.4    L        

    

 

             MEAN CELL HGB CONCETRATION (test 33.4 G/DL    32.1-34.5    N       

     



             code = MCHC)                                        

 

             RED CELL DISTRIBUTION WIDTH (test 13.0 SD      11.5-14.5    N      

      



             code = RDW)                                         

 

             PLATELET COUNT (test code = PLT) 274.0 K/mm3  150-450      N       

     

 

             MEAN PLATELET VOLUME (test code = 10.50 fL     7.0-9.6      H      

      



             MPV)                                                

 

             NEUTROPHIL % (test code = NT%) 75.7 %       40-76        N         

   

 

             LYMPHOCYTE % (test code = LY%) 16.5 %       20.5-51.1    L         

   

 

             MONOCYTE % (test code = MO%) 6.9 %        1.7-9.3      N           

 

 

             EOSINOPHIL % (test code = EO%) 0.6 %        0.0-6.0      N         

   

 

             BASOPHIL % (test code = BA%) 0.3 %        0.0-2.0      N           

 

 

             NEUTROPHIL # (test code = NT#) 6.80 K/mm3   1.8-7.6      N         

   

 

             LYMPHOCYTE # (test code = LY#) 1.5 K/mm3    0.6-3.0      N         

   

 

             MONOCYTE # (test code = MO#) 0.6 K/mm3    0.2-1.5      N           

 

 

             EOSINOPHIL # (test code = EO#) 0.1 K/mm3    0.0-0.4      N         

   

 

             BASOPHIL # (test code = BA#) 0.0 K/mm3    0.0-0.2      N           

 

 

             MANUAL DIFF REQUIRED (test code = NO DIFF/SCN  CRITERIA            

      



             MDIFF)

## 2022-08-24 NOTE — EDPHYS
Physician Documentation                                                                           

 [facility]                                                                 

Name: John Ott                                                                           

Age: 57 yrs                                                                                       

Sex: Male                                                                                         

: 1965                                                                                   

MRN: [id number]                                                                                   

Arrival Date: 2022                                                                          

Time: 09:08                                                                                       

Account#: [id number]                                                                            

Bed 23                                                                                            

Private MD:                                                                                       

ED Physician Ladarius Sloan                                                                       

HPI:                                                                                              

                                                                                             

09:10 This 57 yrs old  Male presents to ER via Ambulatory with complaints of Flank    jmm 

      Pain - left, blood in urine.                                                                

09:10 The patient complains of pain in the left flank. The pain radiates to the abdomen.      jmm 

      Onset: The symptoms/episode began/occurred acutely. Modifying factors: The symptoms are     

      alleviated by nothing. the symptoms are aggravated by nothing. Associated signs and         

      symptoms: Pertinent positives: nausea, Pertinent negatives: dysuria, fever, vomiting.       

      The patient has not experienced similar symptoms in the past.                               

                                                                                                  

Historical:                                                                                       

- Allergies:                                                                                      

09:14 No Known Allergies;                                                                     ap3 

- PMHx:                                                                                           

09:14 CAD; Diabetes - NIDDM; Gout; Hyperlipidemia; Hypertension;                              ap3 

                                                                                                  

- Immunization history:: Client reports receiving the 2nd dose of the Covid vaccine.              

- Social history:: Smoking status: Patient denies any tobacco usage or history of.                

                                                                                                  

                                                                                                  

ROS:                                                                                              

09:10 Constitutional: Negative for fever, chills, and weight loss, Cardiovascular: Negative   jmm 

      for chest pain, palpitations, and edema, Respiratory: Negative for shortness of breath,     

      cough, wheezing, and pleuritic chest pain.                                                  

09:10 Back: Positive for flank pain, on the left.                                                 

09:10 All other systems are negative.                                                             

                                                                                                  

Exam:                                                                                             

09:10 Constitutional:  This is a well developed, well nourished patient who is awake, alert,  jmm 

      and in no acute distress. Head/Face:  atraumatic. Eyes:  EOMI, no conjunctival erythema     

      appreciated ENT:  Moist Mucus Membranes Neck:  Trachea midline, Supple Chest/axilla:        

      Normal chest wall appearance and motion.   Cardiovascular:  Regular rate and rhythm.        

      No edema appreciated Respiratory:  Normal respirations, no respiratory distress             

      appreciated Abdomen/GI:  Non distended                                                      

09:10 Skin:  General appearance color normal MS/ Extremity:  Moves all extremities, no            

      obvious deformities appreciated, no edema noted to the lower extremities  Neuro:  Awake     

      and alert Psych:  Behavior is normal, Mood is normal, Patient is cooperative and            

      pleasant                                                                                    

09:10 Back: CVA tenderness, that is mild, is noted on the left.                                   

                                                                                                  

Vital Signs:                                                                                      

09:13  / 79; Pulse 90; Resp 18; Pulse Ox 100% ; Weight 86.18 kg; Height 5 ft. 6 in.     ap3 

      (167.64 cm); Pain 10/10;                                                                    

10:36  / 66; Pulse 88; Resp 15; Pulse Ox 98% on R/A;                                    vg1 

11:42  / 75; Pulse 87; Resp 15; Pulse Ox 99% on R/A;                                    vg1 

12:12  / 80; Pulse 88; Resp 16; Pulse Ox 98% on R/A;                                    vg1 

09:13 Body Mass Index 30.67 (86.18 kg, 167.64 cm)                                             ap3 

                                                                                                  

MDM:                                                                                              

09:17 Patient medically screened.                                                             [facility] 

11:45 Data reviewed: vital signs, nurses notes. Counseling: I had a detailed discussion with  fallon 

      the patient and/or guardian regarding: the historical points, exam findings, and any        

      diagnostic results supporting the discharge/admit diagnosis, lab results, radiology         

      results, the need for outpatient follow up, to return to the emergency department if        

      symptoms worsen or persist or if there are any questions or concerns that arise at          

      home. ED course: Pain relieved in the ED. Patient advised to follow up with urology for     

      further evaluation. Patient understood and agrees with the plan of care. .                  

                                                                                                  

                                                                                             

09:10 Order name: CBC with Diff; Complete Time: 09:42                                         [facility] 

                                                                                             

09:10 Order name: CMP; Complete Time: 09:58                                                   [facility] 

                                                                                             

09:10 Order name: Lipase; Complete Time: 09:58                                                [facility] 

                                                                                             

09:10 Order name: CT Stone Protocol; Complete Time: 10:04                                     [facility] 

                                                                                             

09:29 Order name: Urine Dipstick-Ancillary; Complete Time: 09:32                              [facility]

                                                                                             

09:10 Order name: IV Saline Lock; Complete Time: 09:31                                        [facility] 

                                                                                             

09:10 Order name: Labs collected and sent; Complete Time: 09:31                               [facility] 

                                                                                             

09:10 Order name: Urine Dipstick-Ancillary (obtain specimen); Complete Time: 09:29            [facility] 

                                                                                                  

Administered Medications:                                                                         

09:30 Drug: Zofran (Ondansetron) 4 mg Route: IVP; Site: left antecubital;                     vg1 

10:35 Follow up: Response: No adverse reaction                                                vg1 

09:32 Drug: morphine 4 mg Route: IVP; Infused Over: 4 mins; Site: left antecubital;           vg1 

10:35 Follow up: Response: No adverse reaction; Marked relief of symptoms; RASS: Alert and    vg1 

      Calm (0)                                                                                    

09:50 Drug: NS 0.9% 1000 ml Route: IV; Rate: 1 bolus; Site: left antecubital;                 vg1 

10:35 Follow up: IV Status: Completed infusion; IV Intake: 1000ml                             vg1 

10:31 Drug: Flomax (tamsulosin) 0.4 mg Route: PO;                                             vg1 

11:42 Follow up: Response: No adverse reaction                                                vg1 

11:42 Follow up: Response: No adverse reaction                                                jl7 

10:32 Drug: NS 0.9% 1000 ml Route: IV; Rate: 1 bolus; Site: left antecubital;                 vg1 

11:42 Follow up: IV Status: Completed infusion; IV Intake: 1000ml                             vg1 

12:02 Drug: Zofran (Ondansetron) 4 mg Route: IVP; Site: left antecubital;                     vg1 

12:12 Follow up: Response: Medication administered at discharge.                              vg1 

12:04 Drug: morphine 4 mg Route: IVP; Infused Over: 4 mins; Site: left antecubital;           vg1 

12:12 Follow up: Response: Medication administered at discharge.                              vg1 

                                                                                                  

                                                                                                  

Disposition:                                                                                      

12:27 Co-signature as Attending Physician, Ladarius Sloan MD I agree with the assessment and   kdr 

      plan of care.                                                                               

                                                                                                  

Disposition Summary:                                                                              

22 11:49                                                                                    

Discharge Ordered                                                                                 

      Location: Home                                                                          [facility] 

      Condition: Stable                                                                       [facility] 

      Diagnosis                                                                                   

        - Calculus of ureter                                                                  [facility] 

      Followup:                                                                               [facility] 

        - With: Joseph Rodriguez MD                                                                

        - When: 2 - 3 days                                                                         

        - Reason: Recheck today's complaints, Continuance of care, Re-evaluation by your           

      physician                                                                                   

      Discharge Instructions:                                                                     

        - Discharge Summary Sheet                                                             [facility] 

        - Kidney Stones                                                                       [facility] 

        - Dietary Guidelines to Help Prevent Kidney Stones                                    [facility] 

      Forms:                                                                                      

        - Medication Reconciliation Form                                                      [facility] 

        - Thank You Letter                                                                    [facility] 

        - Antibiotic Education                                                                [facility] 

        - Prescription Opioid Use                                                             [facility] 

      Prescriptions:                                                                              

        - Tylenol-Codeine #3 300 mg-30 mg Oral                                                     

            - take 1 tablet by ORAL route every 4-6 hours; 20 tablet; Refills: 0, Product     [facility] 

      Selection Permitted                                                                         

        - Flomax 0.4 mg Oral capsule                                                               

            - take 1 capsule by ORAL route once daily 1/2 hour following the same meal each   [facility] 

      day; 20 capsule; Refills: 0, Product Selection Permitted                                    

        - ondansetron 4 mg Oral tablet,disintegrating                                              

            - take 1 tablet by ORAL route every 4-6 hours As needed; 20 tablet; Refills: 0,   [facility] 

      Product Selection Permitted                                                                 

Signatures:                                                                                       

Dispatcher [employer]                           Ladarius Reece MD MD kdr Mickail, Joel, PA PA   [facility]                                                  

Lupe Miguel RN                    RN   ap3                                                  

Makenna Fay RN                    RN   vg1                                                  

Miky Murdock RN   jl7                                                  

                                                                                                  

**************************************************************************************************

## 2022-09-17 ENCOUNTER — HOSPITAL ENCOUNTER (EMERGENCY)
Dept: HOSPITAL 97 - ER | Age: 57
LOS: 1 days | Discharge: HOME | End: 2022-09-18
Payer: COMMERCIAL

## 2022-09-17 DIAGNOSIS — Z20.822: ICD-10-CM

## 2022-09-17 DIAGNOSIS — I10: ICD-10-CM

## 2022-09-17 DIAGNOSIS — I50.40: Primary | ICD-10-CM

## 2022-09-17 LAB
ALBUMIN SERPL BCP-MCNC: 3.7 G/DL (ref 3.4–5)
ALP SERPL-CCNC: 75 U/L (ref 45–117)
ALT SERPL W P-5'-P-CCNC: 79 U/L (ref 12–78)
AST SERPL W P-5'-P-CCNC: 42 U/L (ref 15–37)
BUN BLD-MCNC: 19 MG/DL (ref 7–18)
GLUCOSE SERPLBLD-MCNC: 175 MG/DL (ref 74–106)
HCT VFR BLD CALC: 43.2 % (ref 39.6–49)
INR BLD: 1.17
LYMPHOCYTES # SPEC AUTO: 1.4 K/UL (ref 0.7–4.9)
MAGNESIUM SERPL-MCNC: 1.8 MG/DL (ref 1.8–2.4)
MCV RBC: 85.4 FL (ref 80–100)
PMV BLD: 9.2 FL (ref 7.6–11.3)
POTASSIUM SERPL-SCNC: 3.9 MMOL/L (ref 3.5–5.1)
RBC # BLD: 5.05 M/UL (ref 4.33–5.43)
TROPONIN I SERPL HS-MCNC: 28.6 PG/ML (ref ?–58.9)

## 2022-09-17 PROCEDURE — 85610 PROTHROMBIN TIME: CPT

## 2022-09-17 PROCEDURE — 83880 ASSAY OF NATRIURETIC PEPTIDE: CPT

## 2022-09-17 PROCEDURE — 36415 COLL VENOUS BLD VENIPUNCTURE: CPT

## 2022-09-17 PROCEDURE — 80048 BASIC METABOLIC PNL TOTAL CA: CPT

## 2022-09-17 PROCEDURE — 71045 X-RAY EXAM CHEST 1 VIEW: CPT

## 2022-09-17 PROCEDURE — 83735 ASSAY OF MAGNESIUM: CPT

## 2022-09-17 PROCEDURE — 84484 ASSAY OF TROPONIN QUANT: CPT

## 2022-09-17 PROCEDURE — 85025 COMPLETE CBC W/AUTO DIFF WBC: CPT

## 2022-09-17 PROCEDURE — 93005 ELECTROCARDIOGRAM TRACING: CPT

## 2022-09-17 PROCEDURE — 80076 HEPATIC FUNCTION PANEL: CPT

## 2022-09-17 PROCEDURE — 96374 THER/PROPH/DIAG INJ IV PUSH: CPT

## 2022-09-17 PROCEDURE — 99285 EMERGENCY DEPT VISIT HI MDM: CPT

## 2022-09-17 NOTE — ER
Nurse's Notes                                                                                     

 [facility]                                                                 

Name: John Ott                                                                           

Age: 57 yrs                                                                                       

Sex: Male                                                                                         

: 1965                                                                                   

MRN: [id number]                                                                                   

Arrival Date: 2022                                                                          

Time: 19:15                                                                                       

Account#: [id number]                                                                            

Bed 16                                                                                            

Private MD:                                                                                       

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    

                                                                                                  

Presentation:                                                                                     

                                                                                             

19:28 Chief complaint: Patient states: "I am having shortness of breath, tightness in my      hb  

      chest. I am also having trouble sleeping. I just had the pneumonia shot and shingle         

      shots yesterday.". Chief complaint: Patient states: "This chest tightness has been          

      going on for two-three weeks. I have just been ignoring it.". Coronavirus screen:           

      Vaccine status: Patient reports receiving the 2nd dose of the covid vaccine. Pzifer.        

      Ebola Screen: Patient negative for fever greater than or equal to 101.5 degrees             

      Fahrenheit, and additional compatible Ebola Virus Disease symptoms Patient denies           

      exposure to infectious person. Patient denies travel to an Ebola-affected area in the       

      21 days before illness onset. Initial Sepsis Screen: Does the patient meet any 2            

      criteria? No. Patient's initial sepsis screen is negative. Does the patient have a          

      suspected source of infection? No. Patient's initial sepsis screen is negative. Risk        

      Assessment: Do you want to hurt yourself or someone else? Patient reports no desire to      

      harm self or others. Onset of symptoms is unknown.                                          

19:28 Method Of Arrival: Ambulatory                                                           hb  

19:28 Acuity: FRANKIE 2                                                                           hb  

                                                                                                  

Triage Assessment:                                                                                

19:31 General: Appears in no apparent distress. Behavior is calm, cooperative, appropriate    hb  

      for age. Pain: Pain currently is 2 out of 10 on a pain scale. Cardiovascular: Capillary     

      refill < 3 seconds.                                                                         

                                                                                                  

Historical:                                                                                       

- Allergies:                                                                                      

19:30 No Known Allergies;                                                                     hb  

- Home Meds:                                                                                      

19:30 Metoprolol Tartrate Oral [Active]; Metformin Oral [Active]; losartan Oral [Active];     hb  

      Glipizide Oral [Active]; atorvastatin Oral [Active]; Allopurinol Oral [Active];             

- PMHx:                                                                                           

19:30 CAD; Diabetes - NIDDM; Gout; Hyperlipidemia; Hypertension; Angina pectoris;             hb  

                                                                                                  

- Immunization history:: Adult Immunizations up to date, Pneumococcal vaccine is up to            

  date.                                                                                           

- Social history:: Smoking status: Patient denies any tobacco usage or history of.                

                                                                                                  

                                                                                                  

Screenin:17 Abuse screen: Denies threats or abuse. Denies injuries from another. Nutritional        lg3 

      screening: No deficits noted. Tuberculosis screening: No symptoms or risk factors           

      identified. Fall Risk None identified.                                                      

                                                                                                  

Assessment:                                                                                       

20:17 General: Appears in no apparent distress. comfortable, Behavior is calm, cooperative.   lg3 

      Pain: Complains of pain in chest Pain does not radiate. Pain currently is 4 out of 10       

      on a pain scale. Quality of pain is described as pressure, Pain began PT STATES SEVERAL     

      WEEKS AGO. Neuro: No deficits noted. Level of Consciousness is awake, alert, obeys          

      commands, Oriented to person, place, time, situation. Cardiovascular: Reports chest         

      pain, shortness of breath, Capillary refill < 3 seconds Clubbing of nail beds is absent     

      JVD is absent Patient's skin is warm and dry. Respiratory: No deficits noted. Airway is     

      patent Trachea midline Respiratory effort is even, unlabored, Respiratory pattern is        

      regular, symmetrical. GI: No deficits noted. No signs and/or symptoms were reported         

      involving the gastrointestinal system. Abdomen is round non-distended, Bowel sounds         

      present X 4 quads. Abd is soft and non tender X 4 quads. : No deficits noted. No          

      signs and/or symptoms were reported regarding the genitourinary system. EENT: No            

      deficits noted. No signs and/or symptoms were reported regarding the EENT system. Derm:     

      No deficits noted. No signs and/or symptoms reported regarding the dermatologic system.     

      Skin is intact, is healthy with good turgor, Skin is dry, Skin is normal, Skin              

      temperature is warm. Musculoskeletal: No deficits noted. No signs and/or symptoms           

      reported regarding the musculoskeletal system. Circulation, motion, and sensation           

      intact. Range of motion: intact in all extremities.                                         

21:06 Reassessment: Patient appears in no apparent distress at this time. No changes from     lg3 

      previously documented assessment. Patient and/or family updated on plan of care and         

      expected duration. Pain level reassessed. Patient is alert, oriented x 3, equal             

      unlabored respirations, skin warm/dry/pink.                                                 

22:17 Reassessment: Patient appears in no apparent distress at this time. No changes from     lg3 

      previously documented assessment. Patient and/or family updated on plan of care and         

      expected duration. Pain level reassessed. Patient is alert, oriented x 3, equal             

      unlabored respirations, skin warm/dry/pink.                                                 

23:27 General: pt ambulated around unit. denies any SOB at this time. states "i feel so much  lg3 

      better and feel like i can breathe now" .                                                   

                                                                                             

00:03 Reassessment: Patient states feeling better. Patient states symptoms have improved.     tw5 

                                                                                                  

Vital Signs:                                                                                      

                                                                                             

19:28  / 84; Pulse 93; Resp 18; Temp 98.6; Pulse Ox 97% on R/A; Weight 85.73 kg; Height hb  

      5 ft. 6 in. (167.64 cm); Pain 2/10;                                                         

20:17  / 88; Pulse 81; Resp 26 S; Pulse Ox 99% on R/A;                                  lg3 

21:06  / 84; Pulse 90; Resp 24; Pulse Ox 97% on R/A;                                    lg3 

22:17  / 88; Pulse 89; Resp 22 S; Pulse Ox 97% on R/A;                                  lg3 

                                                                                             

00:02  / 78; Pulse 86; Resp 16; Pulse Ox 99% on R/A; Pain 0/10;                         tw5 

                                                                                             

19:28 Body Mass Index 30.51 (85.73 kg, 167.64 cm)                                             hb  

                                                                                                  

ED Course:                                                                                        

                                                                                             

19:15 Patient arrived in ED.                                                                  am2 

19:30 Triage completed.                                                                       hb  

19:31 Arm band placed on.                                                                     hb  

19:35 Curt Torres PA is PHCP.                                                                cp  

19:35 Curt Turpin MD is Attending Physician.                                             cp  

20:04 Misa Young, RN is Primary Nurse.                                                     lg3 

20:04 COVID-19 SARS RT PCR (Document "Date of Onset" if Symptomatic) Sent.                    lg3 

20:06 Patient has correct armband on for positive identification. Placed in gown. Bed in low  mh5 

      position. Side rails up X 1. Adult w/ patient. Warm blanket given. Cardiac monitor on.      

      Pulse ox on. NIBP on.                                                                       

20:06 COVID swab sent to lab.                                                                 mh5 

20:17 Basic Metabolic Panel Sent.                                                             lg3 

20:17 CBC with Diff Sent.                                                                     lg3 

20:17 LFT's Sent.                                                                             lg3 

20:17 Magnesium Sent.                                                                         lg3 

20:17 NT PRO-BNP Sent.                                                                        lg3 

20:17 PT-INR Sent.                                                                            lg3 

20:17 Troponin HS Sent.                                                                       lg3 

20:17 Inserted saline lock: 20 gauge in right antecubital area, using aseptic technique.      lg3 

      Blood collected. Patient maintains SpO2 saturation greater than 95% on room air.            

21:16 XRAY Chest (1 view) In Process Unspecified.                                             EDMS

23:34 Jeancarlos Vargas MD is Referral Physician.                                               cp  

                                                                                             

00:03 No provider procedures requiring assistance completed. IV discontinued, intact,         tw5 

      bleeding controlled, No redness/swelling at site. Pressure dressing applied.                

                                                                                                  

Administered Medications:                                                                         

                                                                                             

21:36 Drug: Lasix (furosemide) 40 mg Route: IVP; Site: right antecubital;                     lg3 

                                                                                             

00:02 Follow up: Response: No adverse reaction                                                tw5 

                                                                                                  

                                                                                                  

Medication:                                                                                       

00:03 VIS not applicable for this client.                                                     tw5 

                                                                                                  

Outcome:                                                                                          

                                                                                             

23:34 Discharge ordered by MD.                                                                cp  

                                                                                             

00:03 Discharged to home ambulatory.                                                          tw5 

      Condition: good                                                                             

      Discharge instructions given to patient, Instructed on discharge instructions, follow       

      up and referral plans. Demonstrated understanding of instructions, follow-up care,          

      medications, Prescriptions given X 1.                                                       

00:03 Patient left the ED.                                                                    tw5 

                                                                                                  

Signatures:                                                                                       

Dispatcher [employer]                           EDMS                                                 

Curt Torres PA PA   cp                                                   

Rebekah Reeves RN                     RN                                                      

Bere Vergara                              5                                                  

Lupe Steele Lacie, RN                       RN   lg3                                                  

Charu Alvarenga                                tw5                                                  

                                                                                                  

Corrections: (The following items were deleted from the chart)                                    

                                                                                             

19:31 19:28 Acuity: FRANKIE 3 hb                                                                  hb  

22:19 20:17  / 88; Pulse 81bpm; Resp 19bpm; Spontaneous; Pulse Ox 99% RA; lg3           lg3 

22:19 21:06  / 84; Pulse 90bpm; Resp 19bpm; Pulse Ox 99% RA; lg3                        lg3 

                                                                                                  

**************************************************************************************************

## 2022-09-17 NOTE — XMS REPORT
Continuity of Care Document

                          Created on:2022



Patient:DAYDAY PETER

Sex:Male

:1965

External Reference #:[id number]





Demographics







                          Address                   [address] [place] [address]

 

                          Home Phone                [phone]

 

                          Work Phone                [phone]

 

                          Mobile Phone              [phone]

 

                          Email Address             [email or web address]

 

                          Preferred Language        English

 

                          Marital Status            Unknown

 

                          Rastafari Affiliation     Unknown

 

                          Race                      Unknown

 

                          Additional Race(s)        Unavailable



                                                    Unavailable

 

                          Ethnic Group              Unknown









Author







                          Organization              [facility]

t

 

                          Address                   [address]



                                                    [place] [address]

 

                          Phone                     [phone]









Support







                Name            Relationship    Address         Phone

 

                DANILO PETER X               [phone] [place], [place] [id number] 

 

                DANILO PETER Unavailable     [address]    [phone] [place] [id number] 









Care Team Providers







                    Name                Role                [facility] Primary Care Physician 

Unavailable

 

                    SILAS WADDELL Attending Clinician Unavailable

 

                    SILAS WADDELL Attending Clinician Unavailable

 

                    1, [facility] Attending Clinician Unavailable

 

                    Silas Waddell MD Attending Clinician [phone]

 

                    Only, [facility] Test      Attending Clinician Unavailable

 

                    Doctor Unassigned, No Name Attending Clinician Unavailable

 

                    Clark Tipton   Attending Clinician Unavailable

 

                    Clark Tipton   Admitting Clinician Unavailable









Payers







           Payer Name Policy Type Policy Number Effective Date Expiration Date S

melecio RAY              [id number]  2021            



           ADMINISTRATION                       00:00:00              

 

           [employer] [place]            [id number]            



           OF Pending sale to [facility]                         00:00:00              







Problems







       Condition Condition Condition Status Onset  Resolution Last   Treating Co

mments 

Source



       Name   Details Category        Date   Date   Treatment Clinician        



                                                 Date                 

 

       No known No known Disease                                           Un[facility]

rs



       active active                                                  [facility]



       problems problems                                                  [facility]







Allergies, Adverse Reactions, Alerts







       Allergy Allergy Status Severity Reaction(s) Onset  Inactive Treating Comm

ents 

Source



       Name   Type                        Date   Date   Clinician        

 

       No Known DA     Active U             2011                      [facility]



       Allergie                             2-                        [place]



       s                                  00:00:                      [place]



                                          00                          [facility]

 

       NO KNOWN Drug   Active                                           [facility]



       ALLERGIE Class                                                   [facility]



       S                                                              [facility]







Social History







           Social Habit Start Date Stop Date  Quantity   Comments   Source

 

           Exposure to                       Not sure              [facility]

 [place]



           SARS-CoV-2 (event)                                             [facility]

[facility]

 

           Sex Assigned At 1965                       [facility]



           Birth      00:00:00   00:00:00                         [facility]









                Smoking Status  Start Date      Stop Date       Source

 

                Unknown if ever smoked                                 [facility]







Medications







       Ordered Filled Start  Stop   Current Ordering Indication Dosage Frequency

 Signature

                    Comments            Components          Source



     Medication Medication Date Date Medication? Clinician                (SIG) 

          



     Name Name                                                   

 

     ibuprofen            Yes                      ibuprofen           Uni

vers



     400 mg      9-01                               400 mg           ity of



     tablet      16:48:                               tablet           [place]



               22                                 TAKE ONE           Medical



                                                  (1)            Branch



                                                  TABLET(S)           



                                                  BY MOUTH           



                                                  EVERY FOUR           



                                                  HOURS AS           



                                                  NEEDED FOR           



                                                  PAIN.           

 

     ibuprofen            Yes                      ibuprofen           Uni

vers



     400 mg      9-01                               400 mg           ity of



     tablet      16:48:                               tablet           [place]



               22                                 TAKE ONE           Medical



                                                  (1)            Branch



                                                  TABLET(S)           



                                                  BY MOUTH           



                                                  EVERY FOUR           



                                                  HOURS AS           



                                                  NEEDED FOR           



                                                  PAIN.           

 

     ibuprofen            Yes                      ibuprofen           Uni

vers



     400 mg      9-01                               400 mg           ity of



     tablet      16:48:                               tablet           [place]



               22                                 TAKE ONE           Medical



                                                  (1)            Branch



                                                  TABLET(S)           



                                                  BY MOUTH           



                                                  EVERY FOUR           



                                                  HOURS AS           



                                                  NEEDED FOR           



                                                  PAIN.           

 

     ibuprofen            Yes                      ibuprofen           Uni

vers



     400 mg      9-01                               400 mg           ity of



     tablet      16:48:                               tablet           [place]



               22                                 TAKE ONE           Medical



                                                  (1)            Branch



                                                  TABLET(S)           



                                                  BY MOUTH           



                                                  EVERY FOUR           



                                                  HOURS AS           



                                                  NEEDED FOR           



                                                  PAIN.           

 

     ibuprofen            Yes                      ibuprofen           Uni

vers



     400 mg      9-01                               400 mg           ity of



     tablet      11:48:                               tablet           [place]



               22                                 TAKE ONE           Medical



                                                  (1)            Branch



                                                  TABLET(S)           



                                                  BY MOUTH           



                                                  EVERY FOUR           



                                                  HOURS AS           



                                                  NEEDED FOR           



                                                  PAIN.           

 

     ibuprofen            Yes                      ibuprofen           Uni

vers



     400 mg      9-01                               400 mg           ity of



     tablet      11:48:                               tablet           [place]



               22                                 TAKE ONE           Medical



                                                  (1)            Branch



                                                  TABLET(S)           



                                                  BY MOUTH           



                                                  EVERY FOUR           



                                                  HOURS AS           



                                                  NEEDED FOR           



                                                  PAIN.           

 

     ibuprofen            Yes                      ibuprofen           Uni

vers



     400 mg      9-01                               400 mg           ity of



     tablet      11:48:                               tablet           [place]



               22                                 TAKE ONE           Medical



                                                  (1)            Branch



                                                  TABLET(S)           



                                                  BY MOUTH           



                                                  EVERY FOUR           



                                                  HOURS AS           



                                                  NEEDED FOR           



                                                  PAIN.           

 

     ibuprofen            Yes                      ibuprofen           Uni

vers



     400 mg      9-01                               400 mg           ity of



     tablet      11:48:                               tablet           [place]



               22                                 TAKE ONE           Medical



                                                  (1)            Branch



                                                  TABLET(S)           



                                                  BY MOUTH           



                                                  EVERY FOUR           



                                                  HOURS AS           



                                                  NEEDED FOR           



                                                  PAIN.           

 

     losartan 25            Yes                      TAKE ONE           Un

amadeo



     mg tablet      8-27                               TABLET BY           ity o

f



               00:00:                               MOUTH           [place]



               00                                 DAILY FOR           Medical



                                                  BLOOD           Branch



                                                  PRESSURE           

 

     losartan 25            Yes                      TAKE ONE           Un

amadeo



     mg tablet      8-27                               TABLET BY           ity o

f



               00:00:                               MOUTH           [place]



               00                                 DAILY FOR           Medical



                                                  BLOOD           Branch



                                                  PRESSURE           

 

     losartan 25            Yes                      TAKE ONE           Un

amadeo



     mg tablet      8-27                               TABLET BY           ity o

f



               00:00:                               MOUTH           [place]



               00                                 DAILY FOR           Medical



                                                  BLOOD           Branch



                                                  PRESSURE           

 

     losartan 25            Yes                      TAKE ONE           Un

amadeo



     mg tablet      8-27                               TABLET BY           ity o

f



               00:00:                               MOUTH           [place]



               00                                 DAILY FOR           Medical



                                                  BLOOD           Branch



                                                  PRESSURE           

 

     losartan 25            Yes                      TAKE ONE           Un

amadeo



     mg tablet      8-27                               TABLET BY           ity o

f



               00:00:                               MOUTH           [place]



               00                                 DAILY FOR           Medical



                                                  BLOOD           Branch



                                                  PRESSURE           

 

     losartan 25            Yes                      TAKE ONE           Un

amadeo



     mg tablet      8-27                               TABLET BY           ity o

f



               00:00:                               MOUTH           [place]



               00                                 DAILY FOR           Medical



                                                  BLOOD           Branch



                                                  PRESSURE           

 

     losartan 25            Yes                      TAKE ONE           Un

amadeo



     mg tablet      8-27                               TABLET BY           ity o

f



               00:00:                               MOUTH           [place]



               00                                 DAILY FOR           Medical



                                                  BLOOD           Branch



                                                  PRESSURE           

 

     losartan 25            Yes                      TAKE ONE           Un

amadeo



     mg tablet      8-27                               TABLET BY           ity o

f



               00:00:                               MOUTH           [place]



               00                                 DAILY FOR           Medical



                                                  BLOOD           Branch



                                                  PRESSURE           

 

     ergocalcife            Yes                      TAKE ONE           Un

amadeo



     rol,      6-01                               CAPSULE BY           ity of



     vitamin d2,      00:00:                               MOUTH           [place]



     1,250 mcg      00                                 EVERY WEEK           Medi

amadou



     (50,000                                         *DO NOT           Branch



     unit)                                         CRUSH*           



     capsule                                                        

 

     metformin            Yes                      TAKE ONE           Univ

ers



      mg      6-01                               TABLET BY           ity o

f



     24 hr      00:00:                               MOUTH           [place]



     tablet      00                                 DAILY FOR           Medical



                                                  DIABETES           Branch



                                                  *DO NOT           



                                                  CRUSH*           

 

     metoprolol            Yes                      TAKE ONE           Uni

vers



     succinate      6-01                               TABLET BY           ity o

f



     XL 25 mg 24      00:00:                               MOUTH           [place]



     hr tablet      00                                 DAILY FOR           Medic

al



                                                  HEART/BLOO           Branch



                                                  D              



                                                  PRESSURE.           



                                                  *DO NOT           



                                                  CRUSH*           

 

     ergocalcife            Yes                      TAKE ONE           Un

amadeo



     rol,      6-01                               CAPSULE BY           ity of



     vitamin d2,      00:00:                               MOUTH           [place]



     1,250 mcg      00                                 EVERY WEEK           Medi

amadou



     (50,000                                         *DO NOT           Branch



     unit)                                         CRUSH*           



     capsule                                                        

 

     metformin            Yes                      TAKE ONE           Univ

ers



      mg      6-01                               TABLET BY           ity o

f



     24 hr      00:00:                               MOUTH           [place]



     tablet      00                                 DAILY FOR           Medical



                                                  DIABETES           Branch



                                                  *DO NOT           



                                                  CRUSH*           

 

     metoprolol            Yes                      TAKE ONE           Uni

vers



     succinate      6-01                               TABLET BY           ity o

f



     XL 25 mg 24      00:00:                               MOUTH           [place]



     hr tablet      00                                 DAILY FOR           Medic

al



                                                  HEART/BLOO           Branch



                                                  D              



                                                  PRESSURE.           



                                                  *DO NOT           



                                                  CRUSH*           

 

     ergocalcife            Yes                      TAKE ONE           Un

amadeo



     rol,      6-01                               CAPSULE BY           ity of



     vitamin d2,      00:00:                               MOUTH           [place]



     1,250 mcg      00                                 EVERY WEEK           Medi

amad[facility]



     (50,000                                         *DO NOT           Branch



     unit)                                         CRUSH*           



     capsule                                                        

 

     metformin            Yes                      TAKE ONE           Univ

ers



      mg      6-01                               TABLET BY           ity o

f



     24 hr      00:00:                               MOUTH           [place]



     tablet      00                                 DAILY FOR           Medical



                                                  DIABETES           Branch



                                                  *DO NOT           



                                                  CRUSH*           

 

     metoprolol            Yes                      TAKE ONE           Uni

vers



     succinate      6-01                               TABLET BY           ity o

f



     XL 25 mg 24      00:00:                               MOUTH           [place]



     hr tablet      00                                 DAILY FOR           Medic

al



                                                  HEART/BLOO           Branch



                                                  D              



                                                  PRESSURE.           



                                                  *DO NOT           



                                                  CRUSH*           

 

     ergocalcife            Yes                      TAKE ONE           Un

amadeo



     rol,      6-01                               CAPSULE BY           ity of



     vitamin d2,      00:00:                               MOUTH           [place]



     1,250 mcg      00                                 EVERY WEEK           Medi

amadou



     (50,000                                         *DO NOT           Branch



     unit)                                         CRUSH*           



     capsule                                                        

 

     metformin            Yes                      TAKE ONE           Univ

ers



      mg      6-01                               TABLET BY           ity o

f



     24 hr      00:00:                               MOUTH           [place]



     tablet      00                                 DAILY FOR           Medical



                                                  DIABETES           Branch



                                                  *DO NOT           



                                                  CRUSH*           

 

     metoprolol            Yes                      TAKE ONE           Uni

vers



     succinate      6-01                               TABLET BY           ity o

f



     XL 25 mg 24      00:00:                               MOUTH           [place]



     hr tablet      00                                 DAILY FOR           Medic

al



                                                  HEART/BLOO           Branch



                                                  D              



                                                  PRESSURE.           



                                                  *DO NOT           



                                                  CRUSH*           

 

     ergocalcife            Yes                      TAKE ONE           Un

amadeo



     rol,      6-01                               CAPSULE BY           ity of



     vitamin d2,      00:00:                               MOUTH           [place]



     1,250 mcg      00                                 EVERY WEEK           Medi

amadou



     (50,000                                         *DO NOT           Branch



     unit)                                         CRUSH*           



     capsule                                                        

 

     metformin            Yes                      TAKE ONE           Univ

ers



      mg      6-01                               TABLET BY           ity o

f



     24 hr      00:00:                               MOUTH           [place]



     tablet      00                                 DAILY FOR           Medical



                                                  DIABETES           Branch



                                                  *DO NOT           



                                                  CRUSH*           

 

     metoprolol            Yes                      TAKE ONE           Uni

vers



     succinate      6-01                               TABLET BY           ity o

f



     XL 25 mg 24      00:00:                               MOUTH           [place]



     hr tablet      00                                 DAILY FOR           Medic

al



                                                  HEART/BLOO           Branch



                                                  D              



                                                  PRESSURE.           



                                                  *DO NOT           



                                                  CRUSH*           

 

     ergocalcife            Yes                      TAKE ONE           Un

[facility]



     rol,      6-01                               CAPSULE BY           ity of



     vitamin d2,      00:00:                               MOUTH           [place]



     1,250 mcg      00                                 EVERY WEEK           Medi

[facility]



     (50,000                                         *DO NOT           Branch



     unit)                                         CRUSH*           



     capsule                                                        

 

     metformin            Yes                      TAKE ONE           Univ

ers



      mg      6-01                               TABLET BY           ity o

f



     24 hr      00:00:                               MOUTH           [place]



     tablet      00                                 DAILY FOR           Medical



                                                  DIABETES           Branch



                                                  *DO NOT           



                                                  CRUSH*           

 

     metoprolol            Yes                      TAKE ONE           Uni

vers



     succinate      6-01                               TABLET BY           ity o

f



     XL 25 mg 24      00:00:                               MOUTH           [place]



     hr tablet      00                                 DAILY FOR           Medic

al



                                                  HEART/BLOO           Branch



                                                  D              



                                                  PRESSURE.           



                                                  *DO NOT           



                                                  CRUSH*           

 

     ergocalcife            Yes                      TAKE ONE           Un

[facility]



     rol,      6-01                               CAPSULE BY           ity of



     vitamin d2,      00:00:                               MOUTH           [place]



     1,250 mcg      00                                 EVERY WEEK           Medi

[facility]ad[facility]



     (50,000                                         *DO NOT           Branch



     unit)                                         CRUSH*           



     capsule                                                        

 

     metformin            Yes                      TAKE ONE           Univ

ers



      mg      6-01                               TABLET BY           ity o

f



     24 hr      00:00:                               MOUTH           [place]



     tablet      00                                 DAILY FOR           Medical



                                                  DIABETES           Branch



                                                  *DO NOT           



                                                  CRUSH*           

 

     metoprolol            Yes                      TAKE ONE           Uni

vers



     succinate      6-01                               TABLET BY           ity o

f



     XL 25 mg 24      00:00:                               MOUTH           [place]



     hr tablet      00                                 DAILY FOR           Medic

al



                                                  HEART/BLOO           Branch



                                                  D              



                                                  PRESSURE.           



                                                  *DO NOT           



                                                  CRUSH*           

 

     ergocalcife            Yes                      TAKE ONE           Un

am[facility]



     rol,      6-01                               CAPSULE BY           ity of



     vitamin d2,      00:00:                               MOUTH           [place]



     1,250 mcg      00                                 EVERY WEEK           Medi

[facility]



     (50,000                                         *DO NOT           Branch



     unit)                                         CRUSH*           



     capsule                                                        

 

     metformin            Yes                      TAKE ONE           Univ

ers



      mg      6-01                               TABLET BY           ity o

f



     24 hr      00:00:                               MOUTH           [place]



     tablet      00                                 DAILY FOR           Medical



                                                  DIABETES           Branch



                                                  *DO NOT           



                                                  CRUSH*           

 

     metoprolol            Yes                      TAKE ONE           Uni

vers



     succinate      6-01                               TABLET BY           ity o

f



     XL 25 mg 24      00:00:                               MOUTH           [place]



     hr tablet      00                                 DAILY FOR           Medic

al



                                                  HEART/BLOO           Branch



                                                  D              



                                                  PRESSURE.           



                                                  *DO NOT           



                                                  CRUSH*           

 

     allopurinoL      2020      Yes                      TAKE ONE           Un

am[facility]



     100 mg      1-23                               TABLET BY           ity of



     tablet      00:00:                               MOUTH           [place]



                                                DAILY FOR           Medical



                                                  GOUT           Branch

 

     atorvastati      2020-      Yes                      TAKE           Univer

s



     n 80 mg      1-23                               ONE-HALF           ity of



     tablet      00:00:                               TABLET BY           [address] AT           [facility]



                                                  BEDTIME           [place]



                                                  FOR            



                                                  CHOLESTERO           



                                                  L              

 

     glipiZIDE      2020      Yes                      TAKE           [facility]



     10 mg      1-23                               ONE-HALF           ity of



     tablet      00:00:                               TABLET BY           [address]           Medical



                                                  TWICE A           Branch



                                                  DAY FOR           



                                                  DIABETES           

 

     allopurinoL      2020      Yes                      TAKE ONE           Un

am[facility]o



     100 mg      1-23                               TABLET BY           ity of



     tablet      00:00:                               MOUTH           [place]



                                                DAILY FOR           Medical



                                                  GOUT           Branch

 

     atorvastati      2020      Yes                      TAKE           Univer

s



     n 80 mg      1-23                               ONE-HALF           ity of



     tablet      00:00:                               TABLET BY           [address] AT           [facility]



                                                  BEDTIME           [place]



                                                  FOR            



                                                  CHOLESTERO           



                                                  L              

 

     glipiZIDE      2020      Yes                      TAKE           [facility]



     10 mg      1-23                               ONE-HALF           ity of



     tablet      00:00:                               TABLET BY           [address]



                                                  TWICE A           Branch



                                                  DAY FOR           



                                                  DIABETES           

 

     allopurinoL      2020      Yes                      TAKE ONE           Un

amadeo



     100 mg      1-23                               TABLET BY           ity of



     tablet      00:00:                               MOUTH           [place]



                                                DAILY FOR           Medical



                                                  GOUT           Branch

 

     atorvastati      2020      Yes                      TAKE           Univer

s



     n 80 mg      1-23                               ONE-HALF           ity of



     tablet      00:00:                               TABLET BY           [address] AT           [facility]



                                                  FOR            



                                                  CHOLESTERO           



                                                  L              

 

     glipiZIDE      2020      Yes                      TAKE           [facility]



     10 mg      1-23                               ONE-HALF           ity of



     tablet      00:00:                               TABLET BY           [address]



                                                  TWICE A           Branch



                                                  DAY FOR           



                                                  DIABETES           

 

     allopurinoL      2020      Yes                      TAKE ONE           Un

amadeo



     100 mg      1-23                               TABLET BY           ity of



     tablet      00:00:                               MOUTH           [place]



                                                DAILY FOR           Medical



                                                  GOUT           Branch

 

     atorvastati      -      Yes                      TAKE           Univer

s



     n 80 mg      1-23                               ONE-HALF           ity of



     tablet      00:00:                               TABLET BY           [address] AT           [facility]



                                                  BEDTIME           [place]



                                                  FOR            



                                                  CHOLESTERO           



                                                  L              

 

     glipiZIDE      2020-      Yes                      TAKE           [facility]



     10 mg      1-23                               ONE-HALF           ity of



     tablet      00:00:                               TABLET BY           [address]           Medical



                                                  TWICE A           Branch



                                                  DAY FOR           



                                                  DIABETES           

 

     allopurinoL      2020      Yes                      TAKE ONE           Un

amadeo



     100 mg      1-23                               TABLET BY           ity of



     tablet      00:00:                               MOUTH           [place]



                                                DAILY FOR           Medical



                                                  GOUT           Branch

 

     atorvastati      -      Yes                      TAKE           Univer

s



     n 80 mg      1-23                               ONE-HALF           ity of



     tablet      00:00:                               TABLET BY           [address] AT           [facility]



                                                  BEDTIME           [place]



                                                  FOR            



                                                  CHOLESTERO           



                                                  L              

 

     glipiZIDE      2020      Yes                      TAKE           [facility]



     10 mg      1-23                               ONE-HALF           ity of



     tablet      00:00:                               TABLET BY           [address]



                                                  TWICE A           Branch



                                                  DAY FOR           



                                                  DIABETES           

 

     allopurinoL      2020      Yes                      TAKE ONE           Un

amadeo



     100 mg      1-23                               TABLET BY           ity of



     tablet      00:00:                               MOUTH           [place]



                                                DAILY FOR           Medical



                                                  GOUT           Branch

 

     atorvastati      2020      Yes                      TAKE           Univer

s



     n 80 mg      1-23                               ONE-HALF           ity of



     tablet      00:00:                               TABLET BY           [address] AT           [facility]



                                                  FOR            



                                                  CHOLESTERO           



                                                  L              

 

     glipiZIDE      2020      Yes                      TAKE           [facility]



     10 mg      1-23                               ONE-HALF           ity of



     tablet      00:00:                               TABLET BY           [address]



                                                  TWICE A           Branch



                                                  DAY FOR           



                                                  DIABETES           

 

     allopurinoL      2020      Yes                      TAKE ONE           Un

amadeo



     100 mg      1-23                               TABLET BY           ity of



     tablet      00:00:                               MOUTH           [place]



                                                DAILY FOR           Medical



                                                  GOUT           Branch

 

     atorvastati      2020      Yes                      TAKE           Univer

s



     n 80 mg      1-23                               ONE-HALF           ity of



     tablet      00:00:                               TABLET BY           [address] AT           [facility]



                                                  FOR            



                                                  CHOLESTERO           



                                                  L              

 

     glipiZIDE      2020      Yes                      TAKE           [facility]



     10 mg      1-23                               ONE-HALF           ity of



     tablet      00:00:                               TABLET BY           [address]



                                                  TWICE A           Branch



                                                  DAY FOR           



                                                  DIABETES           

 

     allopurinoL      2020      Yes                      TAKE ONE           Un

amadeo



     100 mg      1-23                               TABLET BY           ity of



     tablet      00:00:                               MOUTH           [place]



                                                DAILY FOR           Medical



                                                  GOUT           Branch

 

     atorvastati      2020      Yes                      TAKE           Univer

s



     n 80 mg      1-23                               ONE-HALF           ity of



     tablet      00:00:                               TABLET BY           [address] AT           [facility]



                                                  FOR            



                                                  CHOLESTERO           



                                                  L              

 

     glipiZIDE      2020      Yes                      TAKE           [facility]



     10 mg      1-23                               ONE-HALF           ity of



     tablet      00:00:                               TABLET BY           [address]



                                                  TWICE A           Branch



                                                  DAY FOR           



                                                  DIABETES           

 

     No known                No                                      [facility]



     medications                                                        ity of



                                                                 [facility]

 

     No known                No                                      [facility]



     medications                                                        ity of



                                                                 [facility]

 

     No known                No                                      [facility]



     medications                                                        it of



                                                                 [facility]







Vital Signs







             Vital Name   Observation Time Observation Value Comments     Source

 

             Systolic blood 2021 16:46:00 119 mm[Hg]                Univer

sity of



             pressure                                            [facility]

 

             Diastolic blood 2021 16:46:00 70 mm[Hg]                 Unive

rsity of



             pressure                                            [facility]

 

             Heart rate   2021 16:46:00 83 /min                   [facility]

ty of



                                                                 [facility]



                                                                 [place]

 

             Body temperature 2021 16:46:00 36.06 Johanna                 Univ

ersity of



                                                                 [facility]



                                                                 [facility]

 

             Respiratory rate 2021 16:46:00 18 /min                   Univ

ersity of



                                                                 [facility]



                                                                 [place]

 

             Body height  2021 16:46:00 167.6 cm                  [facility]

ty of



                                                                 [facility]



                                                                 [place]

 

             Body weight  2021 16:46:00 84.505 kg                 [facility]

ty of



                                                                 [place] [facility]



                                                                 [facility]

 

             BMI          2021 16:46:00 30.07 kg/m2               Universi

ty of



                                                                 [facility]

 

             Oxygen saturation in 2021 16:46:00 97 /min                   

[facility]



             Arterial blood by                                        [place] [facility]

[facility]



             Pulse oximetry                                        Branch

 

             Systolic blood 2021 14:01:00 124 mm[Hg]                Univer

sity of



             pressure                                            [facility]

 

             Diastolic blood 2021 14:01:00 73 mm[Hg]                 Unive

rsity of



             pressure                                            [facility]

 

             Heart rate   2021 14:01:00 85 /min                   [facility]

ty of



                                                                 [facility]



                                                                 [place]

 

             Body temperature 2021 14:01:00 36.28 Johanna                 Univ

ersity of



                                                                 [facility]

 

             Respiratory rate 2021 14:01:00 18 /min                   Univ

ersity of



                                                                 [facility]



                                                                 [place]

 

             Body height  2021 14:01:00 167.6 cm                  [facility]

ty of



                                                                 [facility]



                                                                 [place]

 

             Body weight  2021 14:01:00 83.28 kg                  [facility]

ty of



                                                                 [place] [facility]



                                                                 [place]

 

             BMI          2021 14:01:00 29.63 kg/m2               [facility]

ty of



                                                                 [place] [facility]



                                                                 [place]

 

             Oxygen saturation in 2021 14:01:00 98 /min                   

[facility]



             Arterial blood by                                        [place] [facility]

[facility]



             Pulse oximetry                                        Branch







Procedures







                Procedure       Date / Time     Performing Clinician Source



                                Performed                       

 

                ASSIGNMENT OF BENEFITS 2021-10-08 14:49:44 Doctor Unassigned, No

 [facility]

 

                DME/SUPPLY JUSTIFICATION 2021 05:01:00 Doctor Unassigned, 

No [facility]

 

                VACCINATIONS - CONSENTS, 2021 05:01:00 Doctor Unassigned, 

No [facility]



                ELIGIBILITY, HISTORY                 Name            Medical Bra

[facility]







Encounters







        Start   End     Encounter Admission Attending Care    Care    Encounter 

Source



        Date/Time Date/Time Type    Type    Clinicians Facility Department ID   

   

 

        2021 Outpatient R       SILAS WADDELL [facility] 

   [id number] 

[facility]



        10:20:00 10:20:00                 SILAS WADDELL                 

17  ity [facility]

 

        2021 Outpatient R       SILAS WADDELL [facility] 

   [id number] 

[facility]



        10:20:00 10:20:00                 [facility]AD[facility]LL                     

    [facility]

 

        2021-10-11 2021-10-11 Outpatient R               [facility]    [id number]

-20 [facility]



        19:30:00 19:30:00                                         [id number]  ity [facility]

 

        2021-10-11 2021-10-11 Outpatient R       SILAS WADDELL [facility] 

   [id number] 

[facility]



        19:30:00 19:30:00                 SILAS WADDELL                     

    [facility]

 

        2021-10-11 2021-10-11 Technician         1, [facility] Sleep [facility]    1.

2.840.114 [id number]

                                        Univers



        14:11:49 16:41:49 Visit           Silas Waddell 350.1.13.

10         ity of



                                                [place] 4.2.7.2.686         [facility]  [phone]         Medi

amadou



                                                        193             Branch

 

        2021-10-08 2021-10-08 Laboratory         Only, [facility] Test [facility]    1.2.840.

114 [id number] 

Univers



        09:55:41 10:10:41 Only            Silas Waddell 350.1.13.

10         ity of



                                                [place] 4.2.7.2.686         [facility]  [phone]         Medi

amadou



                                                        353             Branch

 

        2021-10-08 2021-10-08 Outpatient R               [facility]    [id number]

-20 Univers



        10:00:00 10:00:00                                         [id number]  [facility]

 

        2021-10-08 2021-10-08 Outpatient R               [facility]    [id number]

079 Univers



        10:00:00 10:00:00                                                 [facility]

 

        2021-10-08 2021-10-08 Orders          Doctor SAUER    1.2.840.114 [id number]

41 Univers



        00:00:00 00:00:00 Only            Unassigned, EVELIO   350.1.13.10       

  ity of



                                        [facility] [place] 4.2.7.2.686         Greg

as



                                                        [phone]         [address]

 

        2021 Orders          Doctor  CRISTIANE    1.2.840.114 [id number]

30 Univers



        00:00:00 00:00:00 Only            Unassigned, EVELIO   350.1.13.10       

  ity of



                                        [facility] [place] 4.2.7.2.686         Greg

as



                                                        [phone]         [address]

 

        2021 Office          [facility]    1.2.881.445 [id number]

0174 Univers



        11:36:32 11:56:32 Visit           Silas Kenny 350.1.13.10        

 ity [facility] 4.2.7.2.686         [place]

[place]



                                                [place] [phone]         Me

dical



                                                [facility]5             [facility]                 

 

        2021 Outpatient R       SILAS WADDELL [facility] 

   [id number] 

Univers



        11:40:00 11:40:00                 SILAS WADDELL                 [id number]  [facility]

 

        2021 Outpatient R       SILAS WADDELL [facility] 

   [id number] 

Univers



        11:40:00 11:40:00                 SILAS WADDELL                     

    [facility]

 

        2021 Outpatient R               [facility]    [id number]

-20 Univers



        19:30:00 19:30:00                                         [id number]  [facility]

 

        2021 Outpatient R       SILAS WADDELL [facility] 

   [id number] 

Univers



        19:30:00 19:30:00                 SILAS WADDELL                     

    [facility]

 

        2021 Technician         1, [facility] Sleep [facility]    1.

2.840.114 [id number]

                                        Univers



        15:15:54 17:45:54 Visit           Silas Waddell 350.1.13.

10         ity of



                                                [place] 4.2.7.2.686         [place]

[place]



                                                [place]  [phone]         Medi

amadou



                                                        193             Branch

 

        2021 Orders          Doctor  CRISTIANE    1.2.840.114 [id number]

56 Univers



        00:00:00 00:00:00 Only            Unassigned, EVELIO   350.1.13.10       

  ity of



                                        [facility] [place] 4.2.7.2.686         Greg

as



                                                        [phone]         Medi

amadou



                                                        [address]             [place]

 

        2021 Office          [facility]    [id number]

3268 Univers



        08:48:47 09:42:06 Visit           Silas Kenny 350.1.13.10        

 ity of



                                                [place] 4.2.7.2.686         [place]

s



                                                [facility] [phone]         Me

dical



                                                nal     085             [facility]                 

 

        2021 Outpatient R       SILAS WADDELL [facility] 

   [id number] 

Univers



        09:30:00 09:30:00                 SILAS WADDELL [facility]

 

        2019 Inpatient ARLEY Tipton  [facility].01 [id number]

[address] [facility]



        16:29:00 22:36:00                 Clark                 [address] [place] [address]







Results







           Test Description Test Time  Test Comments Results    Result Comments 

Source

 

           SURG       2019            ---------------------            



                      12:56:00              ---------------------            



                                            ---------------------            



                                            ---------------------            



                                            --------RUN DATE:            



                                            19 [facility] - LAB            



                                            *LIVE* PAGE 1 RUN            



                                            TIME: 1256 Specimen            



                                            Inquiry RUN USER:            



                                            INTERFACE             



                                            ---------------------            



                                            ---------------------            



                                            ---------------------            



                                            ---------------------            



                                            --------PATIENT:            



                                            DAYDAY PETER            



                                            ACCT #: [id number]            



                                            LOC: L.2S U #:            



                                            [id number] AGE/SX:            



                                            54/M ROOM: [facility]            



                                            RE[address] DR:            



                                            Clark Tipton MD            



                                            : 63 BED: 1            



                                            DIS: 19 STATUS:            



                                            DIS Александр TLOC:            



                                            ---------------------            



                                            ---------------------            



                                            ---------------------            



                                            ---------------------            



                                            -------- SPEC #:            



                                            PMC:[id number] RECD:            



                                             STATUS:            



                                            BEBO REQ #: [id number]            



                                            MOY:             



                                            SUBM DR:              



                                            Clark Tipton MD            



                                            ENTERED:              



                                             SP            



                                            TYPE: SURG OTHR DR:            



                                            No Primary or Family            



                                            Physician Self            



                                            Referred  Issac Bailon MDORDERED: SURG PATH            



                                            LVL 3 COPIES TO: No            



                                            Primary or Family            



                                            Physician Self            



                                            Referred              



                                            Clark Tipton MD            



                                            [address]            



                                            [place] [address] [phone]            



                                            [email or web address]            



                                            Issac Cook MD            



                                            [address]            



                                            [address] [place] [address]            



                                            [phone]            



                                            HISTOLOGY: TISSUE ID            



                                            BLK PCS ABIGAIL LEV            



                                            PROCEDURE DISPOSITION            



                                            _______________ ____            



                                            ______ ___ ___ ___            



                                            _______________            



                                            ___________            



                                            GALLBLADDER, NO A 1-2            



                                            1  PROCEDURES: SURG            



                                            PATH LVL 3            



                                            ()            



                                            TISSUES: A.            



                                            GALLBLADDER, NOS -            



                                            GALLBLADDER CLINICAL            



                                            HISTORY ABD PAIN CPT            



                                            CODES CPT CODE(S):            



                                            20981 , , , , , , **            



                                            CONTINUED ON NEXT            



                                            PAGE **               



                                            ---------------------            



                                            ---------------------            



                                            ---------------------            



                                            ---------------------            



                                            --------RUN DATE:            



                                            19 [facility] - LAB            



                                            *LIVE* PAGE 2 RUN            



                                            TIME: 1256 Specimen            



                                            Inquiry  RUN USER:            



                                            INTERFACE             



                                            ---------------------            



                                            ---------------------            



                                            ---------------------            



                                            ---------------------            



                                            --------SPEC #:            



                                            [facility]:[id number] PATIENT:            



                                            DAYDAY PETER            



                                            #[id number]            



                                            (Continued)----------            



                                            ---------------------            



                                            ---------------------            



                                            ---------------------            



                                            -------------------            



                                            FINAL DIAGNOSIS            



                                            Gallbladder,            



                                            laparoscopic            



                                            cholecystectomy: MILD            



                                            CHRONIC CHOLECYSTITIS            



                                            WITH CHOLELITHIASIS            



                                            GROSS DESCRIPTION            



                                            Gallbladder. Received            



                                            in formalin is a            



                                            collapsed             



                                            gallbladder, 8.5 x            



                                            2.7 x 2.0 cm with a            



                                            wall, 0.3 cm in            



                                            average thickness and            



                                            a defect in the body            



                                            extending to the            



                                            gallbladder neck, 3.0            



                                            cm. The mucosa is            



                                            brown-green, smooth            



                                            and velvety. The            



                                            lumen contains scanty            



                                            bile and numerous            



                                            black friable            



                                            calculi, 2.0 x 1.7 x            



                                            0.3 cm in aggregate.            



                                            Five additional            



                                            similar calculi are            



                                            present in the            



                                            container measuring            



                                            1.0 x 0.7 x 0.3 cm in            



                                            aggregate.            



                                            Representative            



                                            section submitted as            



                                            A. /ba/pdb Grossing            



                                            performed at [facility]            



                                            Pathology, [address]            



                                            [facility], [address],            



                                            [place] [address].            



                                            Medical Director:            



                                            Champ Dooley M.D.            



                                            MICROSCOPIC            



                                            DESCRIPTION            



                                            Gallbladder. Sections            



                                            demonstrate            



                                            gallbladder wall with            



                                            glandular mucosa.            



                                            Focal dilated            



                                            Rokitansky-Aschoff            



                                            sinuses are            



                                            identified. There is            



                                            mild patchy chronic            



                                            inflammation in the            



                                            wall of the            



                                            gallbladder. There is            



                                            no evidence of            



                                            dysplasia or            



                                            malignancy.            



                                            /cm------------------            



                                            ---------------------            



                                            ---------------------            



                                            ---------------------            



                                            ----------- Signed            



                                            SIGNATURE ON FILE            



                                            Marry Freeman            



                                            19 1256            



                                            ---------------------            



                                            ---------------------            



                                            ---------------------            



                                            ---------------------            



                                            --------  ** END OF            



                                            REPORT **             









                    COMPREHENSIVE METABOLIC PANEL 2019 18:11:00 









                      Test Item  Value      Reference Range Interpretation Comme

nts









             SODIUM (test code = NA) 137 mmol/L   134-147      N            

 

             POTASSIUM (test code = K) 4.4 mmol/L   3.4-5.0      N            

 

             CHLORIDE (test code = CL) 107 mmol/L   100-108      N            

 

             CARBON DIOXIDE (test code = CO2) 23 mmol/L    21-32        N       

     

 

             ANION GAP (test code = GAP) 7.0 GAP calc 4.0-15.0     N            

 

             GLUCOSE (test code = GLU) 239 MG/DL           H            

 

             BLOOD UREA NITROGEN (test code = BUN) 10 MG/DL     7-18         N  

          

 

             GLOMERULAR FILTRATION RATE (test code = GFR) >=60 max estimate estG

FR >60                       

 

             CREATININE (test code = CREAT) 1.3 MG/DL    0.8-1.3      N         

   

 

             TOTAL PROTEIN (test code = PROT) 7.1 G/DL     6.4-8.2      N       

     

 

             ALBUMIN (test code = ALB) 3.2 G/DL     3.4-5.0      L            

 

             GLOBULIN (test code = GLOB) 3.9 GM/dL                              

 

             ALBUMIN/GLOBULIN RATIO (test code = A/G) 0.8 RATIO    1.2-2.2      

L            

 

             CALCIUM (test code = CA) 7.9 MG/DL    8.5-10.1     L            

 

             BILIRUBIN TOTAL (test code = BILT) 1.20 MG/DL   0.2-1.2      N     

       

 

             SGOT/AST (test code = AST) 91 Unit/L    15-37        H            

 

             SGPT/ALT (test code = ALT) 139 Unit/L   12-78        H            

 

             ALKALINE PHOSPHATASE TOTAL (test code = ALKP) 81 Unit/L      

     N            



GLUCOSE BEDSIDE [id number] 16:44:00





             Test Item    Value        Reference Range Interpretation Comments

 

             GLUCOSE BEDSIDE TESTING (test code 147 mg/dL           H     

       



             = GLUBED)                                           



CBC W/AUTO [id number] 16:07:00





             Test Item    Value        Reference Range Interpretation Comments

 

             WHITE BLOOD CELL (test code = 8.3 K/mm3    3.5-11.0     N          

  



             WBC)                                                

 

             RED BLOOD CELL (test code = RBC) 4.92 M/mm3   4.70-6.10    N       

     

 

             HEMOGLOBIN (test code = HGB) 14.0 G/DL    12.3-15.9    N           

 

 

             HEMATOCRIT (test code = HCT) 41.5 %       35.8-46.7    N           

 

 

             MEAN CELL VOLUME (test code = 84.3 Fl      86.3-98.9    L          

  



             MCV)                                                

 

             MEAN CELL HGB (test code = MCH) 28.5 pg      28.9-34.4    L        

    

 

             MEAN CELL HGB CONCETRATION (test 33.7 G/DL    32.1-34.5    N       

     



             code = MCHC)                                        

 

             RED CELL DISTRIBUTION WIDTH (test 13.2 SD      11.5-14.5    N      

      



             code = RDW)                                         

 

             PLATELET COUNT (test code = PLT) 253.0 K/mm3  150-450      N       

     

 

             MEAN PLATELET VOLUME (test code = 10.30 fL     7.0-9.6      H      

      



             MPV)                                                

 

             NEUTROPHIL % (test code = NT%) 67.6 %       40-76                  

   

 

             LYMPHOCYTE % (test code = LY%) 21.1 %       20.5-51.1    N         

   

 

             MONOCYTE % (test code = MO%) 8.0 %        1.7-9.3      N           

 

 

             EOSINOPHIL % (test code = EO%) 2.7 %        0.0-6.0      N         

   

 

             BASOPHIL % (test code = BA%) 0.6 %        0.0-2.0      N           

 

 

             NEUTROPHIL # (test code = NT#) 5.59 K/mm3   1.8-7.6      N         

   

 

             LYMPHOCYTE # (test code = LY#) 1.7 K/mm3    0.6-3.0      N         

   

 

             MONOCYTE # (test code = MO#) 0.7 K/mm3    0.2-1.5      N           

 

 

             EOSINOPHIL # (test code = EO#) 0.2 K/mm3    0.0-0.4      N         

   

 

             BASOPHIL # (test code = BA#) 0.1 K/mm3    0.0-0.2      N           

 

 

             MANUAL DIFF REQUIRED (test code = NO DIFF/SCN  CRITERIA            

      



             MDIFF)                                              



GLUCOSE BEDSIDE [id number] 13:00:00





             Test Item    Value        Reference Range Interpretation Comments

 

             GLUCOSE BEDSIDE TESTING (test code 143 mg/dL           H     

       



             = GLUBED)                                           



GLUCOSE BEDSIDE [id number] 10:06:00





             Test Item    Value        Reference Range Interpretation Comments

 

             GLUCOSE BEDSIDE TESTING (test code 113 mg/dL           H     

       



             = GLUBED)                                           



GLUCOSE BEDSIDE [id number] 07:51:00





             Test Item    Value        Reference Range Interpretation Comments

 

             GLUCOSE BEDSIDE TESTING (test code 131 mg/dL           H     

       



             = GLUBED)                                           



COMPREHENSIVE METABOLIC [id number] 07:11:00





             Test Item    Value        Reference Range Interpretation Comments

 

             SODIUM (test code = NA) 141 mmol/L   134-147      N            

 

             POTASSIUM (test code = 3.9 mmol/L   3.4-5.0      N            



             K)                                                  

 

             CHLORIDE (test code = 110 mmol/L   100-108      H            



             CL)                                                 

 

             CARBON DIOXIDE (test 25 mmol/L    21-32        N            



             code = CO2)                                         

 

             ANION GAP (test code = 6.0 GAP calc 4.0-15.0     N            



             GAP)                                                

 

             GLUCOSE (test code = 123 MG/DL           H            



             GLU)                                                

 

             BLOOD UREA NITROGEN 10 MG/DL     7-18         N            



             (test code = BUN)                                        

 

             GLOMERULAR FILTRATION >=60 max estimate >60                       



             RATE (test code = GFR) estGFR                                 

 

             CREATININE (test code = 1.3 MG/DL    0.8-1.3      N            



             CREAT)                                              

 

             TOTAL PROTEIN (test code 7.3 G/DL     6.4-8.2      N            



             = PROT)                                             

 

             ALBUMIN (test code = 3.5 G/DL     3.4-5.0      N            



             ALB)                                                

 

             GLOBULIN (test code = 3.8 GM/dL                              



             GLOB)                                               

 

             ALBUMIN/GLOBULIN RATIO 0.9 RATIO    1.2-2.2      L            



             (test code = A/G)                                        

 

             CALCIUM (test code = CA) 8.3 MG/DL    8.5-10.1     L            

 

             BILIRUBIN TOTAL (test 1.40 MG/DL   0.2-1.2      H            



             code = BILT)                                        

 

             SGOT/AST (test code = 102 Unit/L   15-37        H            



             AST)                                                

 

             SGPT/ALT (test code = 151 Unit/L   12-78        H            



             ALT)                                                

 

             ALKALINE PHOSPHATASE 99 Unit/L           N            



             TOTAL (test code = ALKP)                                        



COMPREHENSIVE METABOLIC [id number] 06:59:00





             Test Item    Value        Reference Range Interpretation Comments

 

             SODIUM (test code = NA) 141 mmol/L   134-147      N            

 

             POTASSIUM (test code = K) 3.9 mmol/L   3.4-5.0      N            

 

             CHLORIDE (test code = CL) 110 mmol/L   100-108      H            

 

             CARBON DIOXIDE (test code = CO2) 25 mmol/L    21-32        N       

     

 

             ANION GAP (test code = GAP) 6.0 GAP calc 4.0-15.0     N            

 

             GLUCOSE (test code = GLU) 123 MG/DL           H            

 

             BLOOD UREA NITROGEN (test code = 10 MG/DL     7-18         N       

     



             BUN)                                                

 

             GLOMERULAR FILTRATION RATE (test  estGFR      >60                  

     



             code = GFR)                                         

 

             CREATININE (test code = CREAT)  MG/DL       0.8-1.3                

   

 

             TOTAL PROTEIN (test code = PROT)  G/DL        6.4-8.2              

     

 

             ALBUMIN (test code = ALB)  G/DL        3.4-5.0                   

 

             GLOBULIN (test code = GLOB)  GM/dL                                 

 

             ALBUMIN/GLOBULIN RATIO (test  RATIO       1.2-2.2                  

 



             code = A/G)                                         

 

             CALCIUM (test code = CA) 8.3 MG/DL    8.5-10.1     L            

 

             BILIRUBIN TOTAL (test code =  MG/DL       0.2-1.2                  

 



             BILT)                                               

 

             SGOT/AST (test code = AST)  Unit/L      15-37                     

 

             SGPT/ALT (test code = ALT)  Unit/L      12-78                     

 

             ALKALINE PHOSPHATASE TOTAL (test  Unit/L                     

     



             code = ALKP)                                        



CBC W/AUTO [id number] 06:53:00





             Test Item    Value        Reference Range Interpretation Comments

 

             WHITE BLOOD CELL (test code = 8.3 K/mm3    3.5-11.0     N          

  



             WBC)                                                

 

             RED BLOOD CELL (test code = RBC) 4.92 M/mm3   4.70-6.10    N       

     

 

             HEMOGLOBIN (test code = HGB) 14.0 G/DL    12.3-15.9    N           

 

 

             HEMATOCRIT (test code = HCT) 41.5 %       35.8-46.7    N           

 

 

             MEAN CELL VOLUME (test code = 84.3 Fl      86.3-98.9    L          

  



             MCV)                                                

 

             MEAN CELL HGB (test code = MCH) 28.5 pg      28.9-34.4    L        

    

 

             MEAN CELL HGB CONCETRATION (test 33.7 G/DL    32.1-34.5    N       

     



             code = MCHC)                                        

 

             RED CELL DISTRIBUTION WIDTH (test 13.2 SD      11.5-14.5    N      

      



             code = RDW)                                         

 

             PLATELET COUNT (test code = PLT) 253.0 K/mm3  150-450      N       

     

 

             MEAN PLATELET VOLUME (test code = 10.30 fL     7.0-9.6      H      

      



             MPV)                                                

 

             NEUTROPHIL % (test code = NT%) 67.6 %       40-76                  

   

 

             LYMPHOCYTE % (test code = LY%) 21.1 %       20.5-51.1    N         

   

 

             MONOCYTE % (test code = MO%) 8.0 %        1.7-9.3      N           

 

 

             EOSINOPHIL % (test code = EO%) 2.7 %        0.0-6.0      N         

   

 

             BASOPHIL % (test code = BA%) 0.6 %        0.0-2.0      N           

 

 

             NEUTROPHIL # (test code = NT#) 5.59 K/mm3   1.8-7.6      N         

   

 

             LYMPHOCYTE # (test code = LY#) 1.7 K/mm3    0.6-3.0      N         

   

 

             MONOCYTE # (test code = MO#) 0.7 K/mm3    0.2-1.5      N           

 

 

             EOSINOPHIL # (test code = EO#) 0.2 K/mm3    0.0-0.4      N         

   

 

             BASOPHIL # (test code = BA#) 0.1 K/mm3    0.0-0.2      N           

 

 

             MANUAL DIFF REQUIRED (test code =  DIFF/SCN    CRITERIA            

      



             MDIFF)                                              



- XR CHEST 1 -42-46 23:12:00 Name: DAYDAY PETER [facility]  : 
1965 Age/S: 54 / M [id number] Unit #: [id number] Loc: [place]
 [address] Phys: Clark Tipton MD  Acct: [id number] Dis Date: Status: ADMIN 
PHONE #: [phone] Exam Date: 2019 2300 FAX #: Reason: FOR SURGERY IN 
AM. EXAMS: CPT: [id number] XR CHEST 1 V  11974 Fluoro Time: DAP (Gy m2): Air 
Kerma (mGy): HISTORY: Chest pain. Location:C3 COMPARISON:2012 FINDINGS: 
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 There is mild cardiomegaly. No 
vascular congestion. The lungs are clear of focal consolidation. No effusion,  
pneumothorax,or acute osseous abnormality. IMPRESSION: 1. Heart size is upper 
normal. No focal consolidation. ** Electronically Signed by TIMOTEO Reyes 
** ** on 2019 at 2312 ** Reported and signed by: Jaison Reyes M.D.  CC:
 Clark Tipton MD PAGE 1 Signed Report Name: DAYDAY PETER
and : 1965 Age/S: 54 / M [id number] Unit #: [id number] Loc: 
[place] [address] Phys: Clark Tipton MD Acct: [id number] Dis Date: 
Status: ADM IN  PHONE #: [phone] Exam Date: 2019 2300 FAX #: Reason: 
FOR SURGERY IN AM. EXAMS:  CPT: [id number] XR CHEST 1 V 39716 Fluoro Time: DAP 
(Gy m2): Air Kerma (mGy):  (Continued) Technologist: Kelli Beck, RT(R)(CT) 
Trnscb Date/Time: 2019 (2312) t.SDR.RXC2 Orig Print D/T: S: 2019 
(2315)  PAGE 2 Signed Report- CT ABD PELVIS [id number] 19:50:00 Name: 
DAYDAY PETER : 1965 Age/S: 54 / M [id number] Unit #: [id number] Loc: [place] [address] Phys: Clark Tipton MD  
Acct: [id number] Dis Date: Status: ADM IN PHONE #: [phone] Exam Date: 
2019 FAX #:  Reason: Abd pain EXAMS: CPT: [id number] CTABD PELVIS 
W/CONT 65695  CT ABDOMEN AND PELVIS WITH IV CONTRAST HISTORY: Abd pain 
COMPARISON: MRI abdomen 12. TECHNIQUE: Axial CT images of the abdomen and 
pelvis were obtained with coronal and/or sagittal reformatted views. Automated 
exposure control, iterative reconstruction technique, and/or adjustment of mA 
and/or kV according to patient's size was utilized for radiation dose reduction.
 IV CONTRAST: 100 ml Isovue-300. PO CONTRAST: None. Location: B2. FINDINGS:  
Mild atelectasis present in both lung bases. The heart size is normal. Coronary 
artery calcifications. Epicardial pacemaker leads. Sternotomy wires. The 
gallbladder is distended. There are few small gallstones in the neck of the gall
bladder. There may be a small amount of pericholecystic fluid and minimal 
inflammatory changes. Milddiffuse  low attenuation seen throughout the liver 
suggestive of cyst steatosis. No focal hepatic lesion. Spleen, pancreas and 
adrenal glands are unremarkable. Both kidneys are similar in size, shape and 
enhancement without evidence of hydronephrosis. There are at least 3 
nonobstructing right renal stones measuring up to 3 mm. No definite left renal 
stone.  Urinary bladder is partially distended without wall thickening. The 
prostate is normal in size. No free air, free fluid or evidence of a bowel ob
struction. Normal appendix. No bowel wall thickening. The aorta is normal in 
caliber with mild to moderate atherosclerotic disease. No abdominal or pelvic 
adenopathy. Mild lumbar spondylosis. IMPRESSION: PAGE 1 Signed Report 
(CONTINUED) Name: DAYDAY PETER [facility] : 1965 Age/S: 54/ M
 [id number] Unit #: [id number] Loc: [place] [address] Phys: 
Clark Tipton MD Acct: [id number] Dis Date: Status: ADM IN PHONE #: 
[phone] Exam Date: 2019  FAX #: Reason: Abd pain EXAMS: CPT: 
[id number] CT ABD PELVIS W/CONT 19211 (Continued) Cholelithiasis. Minimal 
inflammatory changes and probable pericholecystic fluid concerning for acute 
cholecystitis. A right upper quadrant ultrasound may be helpful for further 
assessment. Normal appendix. Multiple nonobstructing right renal stones.  ** 
Electronically Signed by TIMOTEO Angelo ** ** on 2019 at 1950 ** 
Reported and signed by: Darshan Angelo M.D.  CC: Clark Tipton MD 
Technologist:Edmundo Nava, RT(R)(CT); .. CTDI: DLP: Trnscb Date/Time: 
2019 () t.SDR.SP17 Orig Print D/T: S: 2019 () PAGE 2  Signed
 ReportLACTIC [id number] 18:23:00





             Test Item    Value        Reference Range Interpretation Comments

 

             LACTIC ACID (test code = LACT) 1.4 mmol/L   0.4-2.0      N         

   



COMPREHENSIVE METABOLIC [id number] 18:09:00





             Test Item    Value        Reference Range Interpretation Comments

 

             SODIUM (test code = NA) 141 mmol/L   134-147      N            

 

             POTASSIUM (test code = 3.9 mmol/L   3.4-5.0      N            



             K)                                                  

 

             CHLORIDE (test code = 110 mmol/L   100-108      H            



             CL)                                                 

 

             CARBON DIOXIDE (test 26 mmol/L    21-32        N            



             code = CO2)                                         

 

             ANION GAP (test code = 5.0 GAP calc 4.0-15.0     N            



             GAP)                                                

 

             GLUCOSE (test code = 116 MG/DL           H            



             GLU)                                                

 

             BLOOD UREA NITROGEN 13 MG/DL     7-18         N            



             (test code = BUN)                                        

 

             GLOMERULAR FILTRATION >=60 max estimate >60                       



             RATE (test code = GFR) estGFR                                 

 

             CREATININE (test code = 1.3 MG/DL    0.8-1.3      N            



             CREAT)                                              

 

             TOTAL PROTEIN (test code 7.4 G/DL     6.4-8.2      N            



             = PROT)                                             

 

             ALBUMIN (test code = 3.6 G/DL     3.4-5.0      N            



             ALB)                                                

 

             GLOBULIN (test code = 3.8 GM/dL                              



             GLOB)                                               

 

             ALBUMIN/GLOBULIN RATIO 1.0 RATIO    1.2-2.2      L            



             (test code = A/G)                                        

 

             CALCIUM (test code = CA) 8.4 MG/DL    8.5-10.1     L            

 

             BILIRUBIN TOTAL (test 1.70 MG/DL   0.2-1.2      H            



             code = BILT)                                        

 

             SGOT/AST (test code = 130 Unit/L   15-37        H            



             AST)                                                

 

             SGPT/ALT (test code = 102 Unit/L   12-78        H            



             ALT)                                                

 

             ALKALINE PHOSPHATASE 76 Unit/L           N            



             TOTAL (test code = ALKP)                                        



LIPID PROFILE (CORONARY RISK)2019 18:09:00





             Test Item    Value        Reference Range Interpretation Comments

 

             TRIGLYCERIDES (test code = TRIG) 93 MG/DL     0-150        N       

     

 

             CHOLESTEROL (test code = CHOL) 140 MG/DL    133-200      N         

   

 

             CHOLESTEROL/HDL RATIO (test code = 2.50 RATIO   >0                 

       



             CHOLHDL)                                            

 

             HDL CHOLESTEROL (test code = HDL) 56 MG/DL     40-59        N      

      

 

             NON-HDL CHOLESTEROL (test code = 84 mg/dL     <130                 

     



             NHDL)                                               

 

             LIPOPROTEIN LDL (test code = LDL) 69 MG/DL     0-129        N      

      

 

             LDL/HDL (test code = LDL/HDL) 1.23 Ratio   1.48-3.22 Avg L         

   



[id number] 18:09:00





             Test Item    Value        Reference Range Interpretation Comments

 

             PHOSPHOROUS (test code = PHOS) 2.5 MG/DL    2.5-4.9      N         

   



RULE OUT MI [id number] 18:09:00





             Test Item    Value        Reference Range Interpretation Comments

 

             CREATINE KINASE 179 Unit/L          N            



             (CK) (test code =                                        



             CK)                                                 

 

             TROPONIN-I (test < 0.015 NG/ML 0.000-0.045  N            Negative: 

</= 0.045



             code = TROPI)                                        Positive: >/= 

0.046



                                                                 Correlation wit

h



                                                                 serial results,

 other



                                                                 cardiac markers

, and



                                                                 clinical findin

gs is



                                                                 necessary to de

termine



                                                                 the clinical



                                                                 significance of

 this



                                                                 result. Quantit

ative



                                                                 results using



                                                                 different



                                                                 methodologies s

hould



                                                                 not be compared

 to one



                                                                 another as nume

rical



                                                                 results may promise

yby



                                                                 method.



[id number] 18:09:00





             Test Item    Value        Reference Range Interpretation Comments

 

             LIPASE (test code = LIP) 457 Unit/L   114-286      H            



[id number] 18:09:00





             Test Item    Value        Reference Range Interpretation Comments

 

             MAGNESIUM (test code = MAG) 2.0 MG/DL    1.8-2.4      N            



COMPREHENSIVE METABOLIC [id number] 18:05:00





             Test Item    Value        Reference Range Interpretation Comments

 

             SODIUM (test code = NA) 141 mmol/L   134-147      N            

 

             POTASSIUM (test code = K) 3.9 mmol/L   3.4-5.0      N            

 

             CHLORIDE (test code = CL) 110 mmol/L   100-108      H            

 

             CARBON DIOXIDE (test code = CO2) 26 mmol/L    21-32        N       

     

 

             ANION GAP (test code = GAP) 5.0 GAP calc 4.0-15.0     N            

 

             GLUCOSE (test code = GLU) 116 MG/DL           H            

 

             BLOOD UREA NITROGEN (test code = 13 MG/DL     7-18         N       

     



             BUN)                                                

 

             GLOMERULAR FILTRATION RATE (test  estGFR      >60                  

     



             code = GFR)                                         

 

             CREATININE (test code = CREAT)  MG/DL       0.8-1.3                

   

 

             TOTAL PROTEIN (test code = PROT)  G/DL        6.4-8.2              

     

 

             ALBUMIN (test code = ALB)  G/DL        3.4-5.0                   

 

             GLOBULIN (test code = GLOB)  GM/dL                                 

 

             ALBUMIN/GLOBULIN RATIO (test  RATIO       1.2-2.2                  

 



             code = A/G)                                         

 

             CALCIUM (test code = CA) 8.4 MG/DL    8.5-10.1     L            

 

             BILIRUBIN TOTAL (test code =  MG/DL       0.2-1.2                  

 



             BILT)                                               

 

             SGOT/AST (test code = AST)  Unit/L      15-37                     

 

             SGPT/ALT (test code = ALT)  Unit/L      12-78                     

 

             ALKALINE PHOSPHATASE TOTAL (test  Unit/L                     

     



             code = ALKP)                                        



LIPID PROFILE (CORONARY RISK)2019 18:05:00





             Test Item    Value        Reference Range Interpretation Comments

 

             TRIGLYCERIDES (test code = TRIG)  MG/DL       0-150                

     

 

             CHOLESTEROL (test code = CHOL)  MG/DL       133-200                

   

 

             CHOLESTEROL/HDL RATIO (test code =  RATIO       >0                 

       



             CHOLHDL)                                            

 

             HDL CHOLESTEROL (test code = HDL)  MG/DL       40-59               

      

 

             NON-HDL CHOLESTEROL (test code = NHDL)  mg/dL       <130           

           

 

             LIPOPROTEIN LDL (test code = LDL)  MG/DL       0-129               

      

 

             LDL/HDL (test code = LDL/HDL)  Ratio       1.48-3.22 Avg           

   



[id number] 18:05:00





             Test Item    Value        Reference Range Interpretation Comments

 

             PHOSPHOROUS (test code = PHOS)  MG/DL       2.5-4.9                

   



RULE OUT MI [id number] 18:05:00





             Test Item    Value        Reference Range Interpretation Comments

 

             CREATINE KINASE (CK) (test code = CK)  Unit/L                

          

 

             TROPONIN-I (test code = TROPI)  NG/ML       0.000-0.045            

   



[id number] 18:05:00





             Test Item    Value        Reference Range Interpretation Comments

 

             LIPASE (test code = LIP)  Unit/L      114-286                   



[id number] 18:05:00





             Test Item    Value        Reference Range Interpretation Comments

 

             MAGNESIUM (test code = MAG)  MG/DL       1.8-2.4                   



PROTHROMBIN [id number] 17:54:00





             Test Item    Value        Reference Range Interpretation Comments

 

             PT PATIENT (test code = PTP) 11.2 SECONDS 9.3-12.9     N           

 

 

             INTERNATIONAL NORMAL RATIO 0.97 INR Unit 0.8-1.2      N            



             (test code = INR)                                        



CBC W/AUTO [id number] 17:48:00





             Test Item    Value        Reference Range Interpretation Comments

 

             WHITE BLOOD CELL (test code = 9.0 K/mm3    3.5-11.0     N          

  



             WBC)                                                

 

             RED BLOOD CELL (test code = RBC) 5.10 M/mm3   4.70-6.10    N       

     

 

             HEMOGLOBIN (test code = HGB) 14.5 G/DL    12.3-15.9    N           

 

 

             HEMATOCRIT (test code = HCT) 43.4 %       35.8-46.7    N           

 

 

             MEAN CELL VOLUME (test code = 85.1 Fl      86.3-98.9    L          

  



             MCV)                                                

 

             MEAN CELL HGB (test code = MCH) 28.4 pg      28.9-34.4    L        

    

 

             MEAN CELL HGB CONCETRATION (test 33.4 G/DL    32.1-34.5    N       

     



             code = MCHC)                                        

 

             RED CELL DISTRIBUTION WIDTH (test 13.0 SD      11.5-14.5    N      

      



             code = RDW)                                         

 

             PLATELET COUNT (test code = PLT) 274.0 K/mm3  150-450      N       

     

 

             MEAN PLATELET VOLUME (test code = 10.50 fL     7.0-9.6      H      

      



             MPV)                                                

 

             NEUTROPHIL % (test code = NT%) 75.7 %       40-76        N         

   

 

             LYMPHOCYTE % (test code = LY%) 16.5 %       20.5-51.1    L         

   

 

             MONOCYTE % (test code = MO%) 6.9 %        1.7-9.3      N           

 

 

             EOSINOPHIL % (test code = EO%) 0.6 %        0.0-6.0      N         

   

 

             BASOPHIL % (test code = BA%) 0.3 %        0.0-2.0      N           

 

 

             NEUTROPHIL # (test code = NT#) 6.80 K/mm3   1.8-7.6      N         

   

 

             LYMPHOCYTE # (test code = LY#) 1.5 K/mm3    0.6-3.0      N         

   

 

             MONOCYTE # (test code = MO#) 0.6 K/mm3    0.2-1.5      N           

 

 

             EOSINOPHIL # (test code = EO#) 0.1 K/mm3    0.0-0.4      N         

   

 

             BASOPHIL # (test code = BA#) 0.0 K/mm3    0.0-0.2      N           

 

 

             MANUAL DIFF REQUIRED (test code = NO DIFF/SCN  CRITERIA            

      



             MDIFF)

## 2022-09-17 NOTE — RAD REPORT
EXAM DESCRIPTION:  Griselda Single View9/17/2022 9:14 pm

 

CLINICAL HISTORY:  Shortness of breath

 

COMPARISON:  2020

 

FINDINGS:   The lungs appear clear of acute infiltrate.

 

The heart is mildly enlarged. Postsurgical changes involve chest.

 

 

IMPRESSION:   No acute abnormalities displayed

## 2022-09-17 NOTE — EDPHYS
Physician Documentation                                                                           

 [facility]                                                                 

Name: John Ott                                                                           

Age: 57 yrs                                                                                       

Sex: Male                                                                                         

: 1965                                                                                   

MRN: [id number]                                                                                   

Arrival Date: 2022                                                                          

Time: 19:15                                                                                       

Account#: [id number]                                                                            

Bed 16                                                                                            

Private MD:                                                                                       

ED Physician Curt Turpin                                                                      

HPI:                                                                                              

                                                                                             

19:55 This 57 yrs old  Male presents to ER via Ambulatory with complaints of Chest    cp  

      Tightness, Shortness Of Breath.                                                             

19:55 The patient has shortness of breath with light activity.                                cp  

19:55 Onset: The symptoms/episode began/occurred for past 2-3 weeks. Duration: The symptoms   cp  

      are continuous, and are steadily getting worse. The patient's shortness of breath is        

      aggravated by exertion, light activity, is alleviated by rest. Associated signs and         

      symptoms: Pertinent positives: chest tightness, but denies chest pain, Pertinent            

      negatives: productive cough, diaphoresis, dizziness, fever, vomiting, syncope. Severity     

      of symptoms: in the emergency department the symptoms have improved moderately. The         

      patient has not experienced similar symptoms in the past. Patient reports PSHX of           

      cardiac bypass surgery. Cardiologist is DR Vargas and last visit was May 2022.             

                                                                                                  

Historical:                                                                                       

- Allergies:                                                                                      

19:30 No Known Allergies;                                                                     hb  

- Home Meds:                                                                                      

19:30 Metoprolol Tartrate Oral [Active]; Metformin Oral [Active]; losartan Oral [Active];     hb  

      Glipizide Oral [Active]; atorvastatin Oral [Active]; Allopurinol Oral [Active];             

- PMHx:                                                                                           

19:30 CAD; Diabetes - NIDDM; Gout; Hyperlipidemia; Hypertension; Angina pectoris;             hb  

                                                                                                  

- Immunization history:: Adult Immunizations up to date, Pneumococcal vaccine is up to            

  date.                                                                                           

- Social history:: Smoking status: Patient denies any tobacco usage or history of.                

                                                                                                  

                                                                                                  

ROS:                                                                                              

20:00 Constitutional: Negative for body aches, chills, fever, poor PO intake.                 cp  

20:00 Eyes: Negative for injury, pain, redness, and discharge.                                cp  

20:00 ENT: Negative for drainage from ear(s), ear pain, sore throat, difficulty swallowing,       

      difficulty handling secretions.                                                             

20:00 Cardiovascular: Positive for chest tightness with exertion, Negative for chest pain,        

      edema, palpitations.                                                                        

20:00 Respiratory: Positive for shortness of breath, on exertion. Negative for cough,             

      wheezing.                                                                                   

20:00 Abdomen/GI: Negative for abdominal pain, nausea, vomiting, and diarrhea.                    

20:00 Back: Negative for pain at rest, pain with movement.                                    cp  

20:00 Neuro: Negative for altered mental status, headache, numbness, syncope, weakness.           

20:00 All other systems are negative.                                                             

                                                                                                  

Exam:                                                                                             

20:05 ECG was reviewed by the Attending Physician.                                            cp  

20:08 Constitutional: The patient appears in no acute distress, alert, awake, comfortable,    cp  

      non-diaphoretic, non-toxic, well developed, well nourished.                                 

20:08 Head/Face:  Normocephalic, atraumatic.                                                  cp  

20:08 Eyes: Periorbital structures: appear normal, Conjunctiva: normal, no exudate, no            

      injection, Sclera: no appreciated abnormality, Lids and lashes: appear normal,              

      bilaterally.                                                                                

20:08 ENT: External ear(s): are unremarkable, Nose: is normal, Mouth: Lips: moist, Oral           

      mucosa: pink and intact, moist.                                                             

20:08 Chest/axilla: Inspection: normal, Palpation: crepitus, is not appreciated, tenderness,      

      is not appreciated.                                                                         

20:08 Cardiovascular: Rate: normal, Rhythm: regular, Edema: lower leg bilaterally, very mild,     

      JVD: is not appreciated.                                                                    

20:08 Respiratory: the patient does not display signs of respiratory distress,  Respirations:     

      normal, no use of accessory muscles, no retractions, labored breathing, is not present,     

      Breath sounds: are clear throughout, no decreased breath sounds, no stridor, no             

      wheezing.                                                                                   

20:08 Abdomen/GI: Inspection: abdomen appears normal, Palpation: abdomen is soft and              

      non-tender, in all quadrants.                                                               

20:08 Back: pain, is absent, ROM is normal.                                                       

20:08 Skin: no rash present.                                                                      

20:08 Neuro: Orientation: to person, place \T\ time. Mentation: is normal, Cerebellar function:   

      is grossly normal, Motor: moves all fours, strength is normal, Sensation: is normal.        

                                                                                                  

Vital Signs:                                                                                      

19:28  / 84; Pulse 93; Resp 18; Temp 98.6; Pulse Ox 97% on R/A; Weight 85.73 kg; Height hb  

      5 ft. 6 in. (167.64 cm); Pain 2/10;                                                         

20:17  / 88; Pulse 81; Resp 26 S; Pulse Ox 99% on R/A;                                  lg3 

21:06  / 84; Pulse 90; Resp 24; Pulse Ox 97% on R/A;                                    lg3 

22:17  / 88; Pulse 89; Resp 22 S; Pulse Ox 97% on R/A;                                  lg3 

18                                                                                             

00:02  / 78; Pulse 86; Resp 16; Pulse Ox 99% on R/A; Pain 0/10;                         tw5 

                                                                                             

19:28 Body Mass Index 30.51 (85.73 kg, 167.64 cm)                                             hb  

                                                                                                  

MDM:                                                                                              

                                                                                             

19:37 Patient medically screened.                                                             jaime 

20:00 Differential diagnosis: CHF exacerbation, Chronic Obstructive Pulmonary Disease         cp  

      Myocardial Infarction pneumonia, Pneumothorax pulmonary edema, Pulmonary Embolism           

      Unstable Angina.                                                                            

23:45 Data reviewed: vital signs, nurses notes, lab test result(s), EKG, radiologic studies,  cp  

      plain films. Test interpretation: by ED physician or midlevel provider: ECG, plain          

      radiologic studies. Counseling: I had a detailed discussion with the patient and/or         

      guardian regarding: the historical points, exam findings, and any diagnostic results        

      supporting the discharge/admit diagnosis, lab results, radiology results. Refusal of        

      service: The patient/guardian displays adequate decision making capability and despite      

      a detailed discussion of alternatives, benefits, risks, and consequences refuses:           

      Admission to the hospital for further work-up and treatment. ED course: VSS> Patient        

      reports symptoms markedly improved with meds and requesting discharge to home. Reports      

      he will f/u with DR Vargas next week.                                                      

                                                                                                  

                                                                                             

19:47 Order name: Basic Metabolic Panel; Complete Time: 21:18                                   

                                                                                             

21:18 Interpretation: Normal except: GLUC 175; BUN 19; CRE 1.45; GFR 56.                        

                                                                                             

19:47 Order name: CBC with Diff; Complete Time: 21:05                                           

                                                                                             

21:05 Interpretation: Normal except: WBC 11.50; JODY% 77.1; LYM% 11.9; NEUT A 8.9.               

                                                                                             

19:47 Order name: LFT's; Complete Time: 21:18                                                   

                                                                                             

21:18 Interpretation: Normal except: AST 42; ALT 79; BILIT 2.0; BILID 0.5; GLOB 3.8; A/G 1.0.   

                                                                                             

19:47 Order name: Magnesium; Complete Time: 21:18                                             cp  

                                                                                             

19:47 Order name: NT PRO-BNP; Complete Time: 21:18                                              

                                                                                             

21:19 Interpretation: Abnormal: NT PRO-BNP 4226.                                                

                                                                                             

19:47 Order name: PT-INR; Complete Time: 21:05                                                  

                                                                                             

19:47 Order name: Troponin HS; Complete Time: 21:18                                             

                                                                                             

21:19 Interpretation: Troponin HS 28.6; Reviewed.                                               

                                                                                             

19:47 Order name: XRAY Chest (1 view); Complete Time: 21:35                                     

                                                                                             

21:35 Interpretation: Report review.                                                            

                                                                                             

19:47 Order name: EKG; Complete Time: 19:48                                                     

                                                                                             

19:47 Order name: Cardiac monitoring; Complete Time: 20:05                                      

                                                                                             

19:47 Order name: EKG - Nurse/Tech; Complete Time: 20:05                                        

                                                                                             

19:47 Order name: IV Saline Lock; Complete Time: 20:16                                          

                                                                                             

19:47 Order name: COVID-19 SARS RT PCR (Document "Date of Onset" if Symptomatic); Complete    cp  

      Time: 21:05                                                                                 

                                                                                             

19:47 Order name: Labs collected and sent; Complete Time: 20:16                                 

                                                                                             

19:47 Order name: O2 Per Protocol; Complete Time: 20:16                                         

                                                                                             

19:47 Order name: O2 Sat Monitoring; Complete Time: 20:16                                     cp  

                                                                                                  

EC:05 Rate is 90 beats/min. Rhythm is regular. NE interval is prolonged at 208 msec. QRS      cp  

      interval is prolonged at 136 msec. QT interval is normal. T waves are Inverted in leads     

      I, aVL, aVR. Interpreted by me. Reviewed by me.                                             

                                                                                                  

Administered Medications:                                                                         

21:36 Drug: Lasix (furosemide) 40 mg Route: IVP; Site: right antecubital;                     lg3 

                                                                                             

00:02 Follow up: Response: No adverse reaction                                                tw5 

                                                                                                  

                                                                                                  

Disposition Summary:                                                                              

22 23:34                                                                                    

Discharge Ordered                                                                                 

      Location: Home                                                                          cp  

      Problem: new                                                                            cp  

      Symptoms: have improved                                                                 cp  

      Condition: Stable                                                                       cp  

      Diagnosis                                                                                   

        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  

      Followup:                                                                               cp  

        - With: Jeancarlos Vargas MD                                                                 

        - When: 2 - 3 days                                                                         

        - Reason: Recheck today's complaints                                                       

      Discharge Instructions:                                                                     

        - Discharge Summary Sheet                                                             cp  

        - Heart Failure, Diagnosis                                                            cp  

        - Aspirin and Your Heart                                                              cp  

      Forms:                                                                                      

        - Medication Reconciliation Form                                                      cp  

        - Thank You Letter                                                                    cp  

        - Antibiotic Education                                                                cp  

        - Prescription Opioid Use                                                             cp  

      Prescriptions:                                                                              

        - Lasix 20 mg Oral Tablet                                                                  

            - take 1 tablet by ORAL route once daily for 5 days; 5 tablet; Refills: 0,        cp  

      Product Selection Permitted                                                                 

Signatures:                                                                                       

Dispatcher [employer]                           Curt Orr MD MD cha Page, Corey, PA PA cp Baxter, Heather, RN                     RN   Misa Rosa RN                       RN   3                                                  

Charu Alvarenga                                [facility]                                                  

                                                                                                  

**************************************************************************************************

## 2022-09-19 VITALS — OXYGEN SATURATION: 99 % | SYSTOLIC BLOOD PRESSURE: 127 MMHG | DIASTOLIC BLOOD PRESSURE: 78 MMHG

## 2022-09-19 VITALS — TEMPERATURE: 98.6 F

## 2022-09-21 NOTE — EKG
Test Date:    2022-09-17               Test Time:    19:57:15

Technician:   ERICK                                    

                                                     

MEASUREMENT RESULTS:                                       

Intervals:                                           

Rate:         90                                     

DC:           208                                    

QRSD:         136                                    

QT:           384                                    

QTc:          469                                    

Axis:                                                

P:            41                                     

DC:           208                                    

QRS:          -61                                    

T:            92                                     

                                                     

INTERPRETIVE STATEMENTS:                                       

                                                     

Normal sinus rhythm

Left axis deviation

Nonspecific intraventricular block

Nonspecific T wave abnormality

Abnormal ECG

No previous ECG available for comparison



Electronically Signed On 09-21-22 06:32:52 CDT by Jeancarlos Vargas

## 2023-02-12 ENCOUNTER — HOSPITAL ENCOUNTER (EMERGENCY)
Dept: HOSPITAL 97 - ER | Age: 58
Discharge: HOME | End: 2023-02-12
Payer: COMMERCIAL

## 2023-02-12 VITALS — SYSTOLIC BLOOD PRESSURE: 119 MMHG | OXYGEN SATURATION: 94 % | DIASTOLIC BLOOD PRESSURE: 96 MMHG

## 2023-02-12 VITALS — TEMPERATURE: 97 F

## 2023-02-12 DIAGNOSIS — R60.9: Primary | ICD-10-CM

## 2023-02-12 DIAGNOSIS — N28.9: ICD-10-CM

## 2023-02-12 DIAGNOSIS — I10: ICD-10-CM

## 2023-02-12 DIAGNOSIS — Z20.822: ICD-10-CM

## 2023-02-12 DIAGNOSIS — E11.9: ICD-10-CM

## 2023-02-12 LAB
ALBUMIN SERPL BCP-MCNC: 3.3 G/DL (ref 3.4–5)
ALP SERPL-CCNC: 143 U/L (ref 45–117)
ALT SERPL W P-5'-P-CCNC: 154 U/L (ref 16–61)
AST SERPL W P-5'-P-CCNC: 55 U/L (ref 15–37)
BUN BLD-MCNC: 33 MG/DL (ref 7–18)
GLUCOSE SERPLBLD-MCNC: 150 MG/DL (ref 74–106)
HCT VFR BLD CALC: 44.4 % (ref 39.6–49)
INR BLD: 1.37
LYMPHOCYTES # SPEC AUTO: 1.5 K/UL (ref 0.7–4.9)
MAGNESIUM SERPL-MCNC: 2 MG/DL (ref 1.6–2.4)
MCV RBC: 83.6 FL (ref 80–100)
NT-PROBNP SERPL-MCNC: 4519 PG/ML (ref ?–125)
PMV BLD: 8.4 FL (ref 7.6–11.3)
POTASSIUM SERPL-SCNC: 4.3 MMOL/L (ref 3.5–5.1)
RBC # BLD: 5.31 M/UL (ref 4.33–5.43)
TROPONIN I SERPL HS-MCNC: 40.8 PG/ML (ref ?–58.9)

## 2023-02-12 PROCEDURE — 93005 ELECTROCARDIOGRAM TRACING: CPT

## 2023-02-12 PROCEDURE — 71045 X-RAY EXAM CHEST 1 VIEW: CPT

## 2023-02-12 PROCEDURE — 74176 CT ABD & PELVIS W/O CONTRAST: CPT

## 2023-02-12 PROCEDURE — 83880 ASSAY OF NATRIURETIC PEPTIDE: CPT

## 2023-02-12 PROCEDURE — 85025 COMPLETE CBC W/AUTO DIFF WBC: CPT

## 2023-02-12 PROCEDURE — 84484 ASSAY OF TROPONIN QUANT: CPT

## 2023-02-12 PROCEDURE — 87811 SARS-COV-2 COVID19 W/OPTIC: CPT

## 2023-02-12 PROCEDURE — 83735 ASSAY OF MAGNESIUM: CPT

## 2023-02-12 PROCEDURE — 99284 EMERGENCY DEPT VISIT MOD MDM: CPT

## 2023-02-12 PROCEDURE — 85610 PROTHROMBIN TIME: CPT

## 2023-02-12 PROCEDURE — 80048 BASIC METABOLIC PNL TOTAL CA: CPT

## 2023-02-12 PROCEDURE — 80076 HEPATIC FUNCTION PANEL: CPT

## 2023-02-12 PROCEDURE — 36415 COLL VENOUS BLD VENIPUNCTURE: CPT

## 2023-02-12 PROCEDURE — 96374 THER/PROPH/DIAG INJ IV PUSH: CPT

## 2023-02-12 NOTE — ER
Nurse's Notes                                                                                     

 [facility]                                                                 

Name: John Ott                                                                           

Age: 57 yrs                                                                                       

Sex: Male                                                                                         

: 1965                                                                                   

MRN: [id number]                                                                                   

Arrival Date: 2023                                                                          

Time: 13:26                                                                                       

Account#: [id number]                                                                            

Bed 8                                                                                             

Private MD:                                                                                       

Diagnosis: Edema;Renal insufficiency                                                              

                                                                                                  

Presentation:                                                                                     

                                                                                             

13:45 Chief complaint: Patient states: swelling for one week to both legs, belly is tight and ko1 

      swollen and my face is swelling also. I have heart problems and I take fluid pills..        

      Coronavirus screen: At this time, the client does not indicate any symptoms associated      

      with coronavirus-19. Ebola Screen: No symptoms or risks identified at this time.            

      Initial Sepsis Screen: Does the patient meet any 2 criteria? No. Patient's initial          

      sepsis screen is negative. Does the patient have a suspected source of infection? No.       

      Patient's initial sepsis screen is negative. Risk Assessment: Do you want to hurt           

      yourself or someone else? Patient reports no desire to harm self or others. Onset of        

      symptoms is unknown.                                                                        

13:45 Method Of Arrival: Ambulatory                                                           ko1 

13:45 Acuity: FRANKIE 2                                                                           ko1 

                                                                                                  

Triage Assessment:                                                                                

13:47 General: Appears in no apparent distress. uncomfortable, Behavior is calm, cooperative, ko1 

      appropriate for age. Pain: Denies pain. Respiratory: Reports shortness of breath on         

      exertion Onset: The symptoms/episode began/occurred gradually, the patient has moderate     

      shortness of breath.                                                                        

                                                                                                  

Historical:                                                                                       

- Allergies:                                                                                      

13:47 No Known Allergies;                                                                     ko1 

- PMHx:                                                                                           

13:47 angina pectoris; CAD; Diabetes - NIDDM; Gout; Hyperlipidemia; Hypertension;             ko1 

                                                                                                  

- Immunization history:: Adult Immunizations up to date.                                          

- Social history:: Smoking status: Patient denies any tobacco usage or history of.                

                                                                                                  

                                                                                                  

Screening:                                                                                        

15:01 [facility] ED Fall Risk Assessment (Adult) History of falling in the last 3 months,       ph  

      including since admission No falls in past 3 months (0 pts) Confusion or Disorientation     

      No (0 pts) Intoxicated or Sedated No (0 pts) Impaired Gait No (0 pts) Mobility Assist       

      Device Used No (0 pt) Altered Elimination No (0 pt) Score/Fall Risk Level 0 - 2 = Low       

      Risk Oriented to surroundings, Maintained a safe environment, Hourly rounding (assess       

      needs \T\ fall precautionary measures) done. Abuse screen: Denies threats or abuse.         

      Denies injuries from another. Nutritional screening: No deficits noted. Tuberculosis        

      screening: No symptoms or risk factors identified.                                          

                                                                                                  

Assessment:                                                                                       

15:12 General: Appears in no apparent distress. comfortable, well groomed, Behavior is calm,  ph  

      cooperative, appropriate for age, Denies fever, feeling ill. Pain: Denies pain. Neuro:      

      Level of Consciousness is awake, alert, obeys commands, Oriented to person, place,          

      time, situation. Cardiovascular: Reports shortness of breath, Denies chest pain,            

      nausea, Capillary refill < 3 seconds in bilateral fingers Patient's skin is warm and        

      dry. Cardiovascular: Edema is 3+ to waist. Cardiovascular: Edema is 2+ to right             

      forearm, right wrist, right hand, right fingers, left midcalf, left ankle, left             

      forearm, left wrist, left hand, left fingers, right midcalf and right ankle.                

      Respiratory: Airway is patent Respiratory effort is even, unlabored, Respiratory            

      pattern is tachypnea. GI: Abdomen is round distended, Patient currently denies              

      abdominal pain, diarrhea, nausea, vomiting. Derm: Skin is healthy with good turgor,         

      Skin is pink, warm \T\ dry. Musculoskeletal: Circulation, motion, and sensation intact.     

      Range of motion: intact in all extremities.                                                 

15:15 Reassessment: Pt taken to CT.                                                           ph  

16:00 Reassessment: Patient appears in no apparent distress at this time. Patient and/or      ph  

      family updated on plan of care and expected duration. Pain level reassessed. Patient is     

      alert, oriented x 3, equal unlabored respirations, skin warm/dry/pink.                      

17:00 Reassessment: Patient appears in no apparent distress at this time. Patient and/or      ph  

      family updated on plan of care and expected duration. Pain level reassessed. Patient is     

      alert, oriented x 3, equal unlabored respirations, skin warm/dry/pink.                      

19:14 Reassessment: Patient states feeling better. Patient states symptoms have improved.     ke1 

      Respiratory: Respiratory effort is even, unlabored, Respiratory pattern is regular,         

      symmetrical.                                                                                

                                                                                                  

Vital Signs:                                                                                      

13:45  / 82; Pulse 102; Resp 24; Temp 97; Pulse Ox 99% on R/A; Weight 84.82 kg; Height  ko1 

      5 ft. 6 in. (167.64 cm); Pain 0/10;                                                         

15:14  / 87; Pulse 95; Resp 24; Pulse Ox 95% on R/A;                                    ph  

16:30  / 88; Pulse 95; Resp 22; Pulse Ox 95% on R/A;                                    ph  

17:30  / 84; Pulse 92; Resp 20; Pulse Ox 96% ;                                          ph  

18:42  / 75; Pulse 90; Resp 24; Pulse Ox 96% on R/A;                                    ph  

19:14  / 96; Pulse 93; Resp 22; Pulse Ox 94% on R/A; Pain 0/10;                         ke1 

13:45 Body Mass Index 30.18 (84.82 kg, 167.64 cm)                                             ko1 

                                                                                                  

Vitals:                                                                                           

18:42 Cardiac Rhythm Assessment Sinus rhythm.                                                 ph  

                                                                                                  

ED Course:                                                                                        

13:26 Patient arrived in ED.                                                                  mr  

13:47 Triage completed.                                                                       ko1 

13:47 Arm band placed on left wrist. Patient notified of wait time.                           ko1 

13:48 Sanjiv Buckner PA is PHCP.                                                              jmm 

13:48 Curt Turpin MD is Attending Physician.                                             jm 

13:53 Lorena Chaves, RN is Primary Nurse.                                                    ph  

14:41 SARS RAPID Sent.                                                                        bc6 

14:41 Basic Metabolic Panel Sent.                                                             bc6 

14:41 CBC with Diff Sent.                                                                     bc6 

14:41 LFT's Sent.                                                                             bc6 

14:41 Magnesium Sent.                                                                         bc6 

14:41 NT PRO-BNP Sent.                                                                        bc6 

14:41 PT-INR Sent.                                                                            bc6 

14:41 Troponin HS Sent.                                                                       bc6 

14:41 Inserted saline lock: 20 gauge in right antecubital area, using aseptic technique.      bc6 

15:02 Patient has correct armband on for positive identification. Bed in low position. Call   ph  

      light in reach. Side rails up X2. Client placed on continuous cardiac and pulse             

      oximetry monitoring. NIBP monitoring applied. Door closed. Noise minimized. Warm            

      blanket given.                                                                              

19:24 Jaya Akbar MD is Referral Physician.                                              [facility] 

19:34 No provider procedures requiring assistance completed. IV discontinued, intact,         ke1 

      bleeding controlled, No redness/swelling at site. Pressure dressing applied.                

                                                                                                  

Administered Medications:                                                                         

15:20 Drug: Lasix (furosemide) 40 mg Route: IVP; Site: right antecubital;                     ph  

18:48 Follow up: Response: No adverse reaction                                                ph  

18:28 Drug: Lasix (furosemide) 40 mg Route: IVP; Site: right antecubital;                     ll1 

18:48 Follow up: Response: No adverse reaction                                                ph  

                                                                                                  

                                                                                                  

Medication:                                                                                       

15:03 VIS not applicable for this client.                                                     ph  

                                                                                                  

Outcome:                                                                                          

19:25 Discharge ordered by MD.                                                                fallon 

19:34 Discharged to home ambulatory, with significant other.                                  ke1 

19:34 Condition: stable                                                                           

19:34 Discharge instructions given to patient, significant other, Instructed on discharge         

      instructions, follow up and referral plans. medication usage, Demonstrated                  

      understanding of instructions, follow-up care, medications, Prescriptions given X 1.        

19:34 Patient left the ED.                                                                    ke1 

                                                                                                  

Signatures:                                                                                       

Sanjiv Buckner PA PA jmm                                                  

Jesus, Violeta ChavesLorena, RN                      RN   Kofi Pandya RN                       RN   1                                                  

Chris López RN                   RN   ke1                                                  

Pauline Pennington RN                       RN   ko1                                                  

Mona Francois                           bc6                                                  

                                                                                                  

Corrections: (The following items were deleted from the chart)                                    

15:16 15:02 Respiratory: ph                                                                   ph  

18:46 18:42  / 75; Pulse 90bpm; Resp 18bpm; Pulse Ox 96% RA; ph                         ph  

                                                                                                  

**************************************************************************************************
Unknown at This Time

## 2023-02-12 NOTE — EDPHYS
Physician Documentation                                                                           

 [facility]                                                                 

Name: John Ott                                                                           

Age: 57 yrs                                                                                       

Sex: Male                                                                                         

: 1965                                                                                   

MRN: [id number]                                                                                   

Arrival Date: 2023                                                                          

Time: 13:26                                                                                       

Account#: [id number]                                                                            

Bed 8                                                                                             

Private MD:                                                                                       

ED Physician Curt Turpin                                                                      

HPI:                                                                                              

                                                                                             

13:49 This 57 yrs old  Male presents to ER via Ambulatory with complaints of          jmm 

      Shortness Of Breath.                                                                        

13:49 The patient has shortness of breath at rest, with light activity. Onset: The            jmm 

      symptoms/episode began/occurred gradually, 1 week(s) ago. The patient's shortness of        

      breath is aggravated by exertion, light activity. Is a 57-year-old male with history of     

      coronary artery disease, diabetes mellitus, hyperlipidemia, hypertension the presents       

      emerged part with complaints of generalized swelling, shortness of breath. Denies chest     

      pain. Also complains of abdominal swelling.                                                 

                                                                                                  

Historical:                                                                                       

- Allergies:                                                                                      

13:47 No Known Allergies;                                                                     ko1 

- PMHx:                                                                                           

13:47 angina pectoris; CAD; Diabetes - NIDDM; Gout; Hyperlipidemia; Hypertension;             ko1 

                                                                                                  

- Immunization history:: Adult Immunizations up to date.                                          

- Social history:: Smoking status: Patient denies any tobacco usage or history of.                

                                                                                                  

                                                                                                  

ROS:                                                                                              

13:49 Constitutional: Negative for fever, chills, and weight loss, Cardiovascular: Negative   jmm 

      for chest pain, palpitations, and edema, Respiratory: Negative for shortness of breath,     

      cough, wheezing, and pleuritic chest pain.                                                  

13:49 Abdomen/GI: Positive for                                                                    

13:49 All other systems are negative.                                                             

                                                                                                  

Exam:                                                                                             

13:49 Constitutional:  This is a well developed, well nourished patient who is awake, alert,  jmm 

      and in no acute distress. Head/Face:  atraumatic. Eyes:  EOMI, no conjunctival erythema     

      appreciated ENT:  Moist Mucus Membranes Neck:  Trachea midline, Supple Chest/axilla:        

      Normal chest wall appearance and motion.   Cardiovascular:  Regular rate and rhythm.        

      No edema appreciated Respiratory:  Normal respirations, no respiratory distress             

      appreciated                                                                                 

13:49 Abdomen/GI: Inspection: distension, Bowel sounds: normal.                                   

13:49 Musculoskeletal/extremity: ROM: intact in all extremities, Bilateral edema noted, full      

      dorsalis pedis pulse, compartments are soft, neurovascular intact.                          

13:49 Skin: Appearance: Color: normal in color.                                                   

13:49 Neuro: Orientation: is normal, Mentation: is normal, Memory: is normal.                     

13:49 Psych: Behavior/mood is pleasant, cooperative.                                              

                                                                                                  

Vital Signs:                                                                                      

13:45  / 82; Pulse 102; Resp 24; Temp 97; Pulse Ox 99% on R/A; Weight 84.82 kg; Height  ko1 

      5 ft. 6 in. (167.64 cm); Pain 0/10;                                                         

15:14  / 87; Pulse 95; Resp 24; Pulse Ox 95% on R/A;                                    ph  

16:30  / 88; Pulse 95; Resp 22; Pulse Ox 95% on R/A;                                    ph  

17:30  / 84; Pulse 92; Resp 20; Pulse Ox 96% ;                                          ph  

18:42  / 75; Pulse 90; Resp 24; Pulse Ox 96% on R/A;                                    ph  

19:14  / 96; Pulse 93; Resp 22; Pulse Ox 94% on R/A; Pain 0/10;                         ke1 

13:45 Body Mass Index 30.18 (84.82 kg, 167.64 cm)                                             ko1 

                                                                                                  

MDM:                                                                                              

13:49 Patient medically screened.                                                             jaime 

19:21 Data reviewed: vital signs, nurses notes. Consideration of Admission/Observation. I     manuel 

      considered the following discharge prescriptions or medication management in the            

      emergency department Medications were administered in the Emergency Department. See         

      MAR. Historians other than the Patient: Wife. Counseling: I had a detailed discussion       

      with the patient and/or guardian regarding: the historical points, exam findings, and       

      any diagnostic results supporting the discharge/admit diagnosis, lab results, radiology     

      results, the need for outpatient follow up, to return to the emergency department if        

      symptoms worsen or persist or if there are any questions or concerns that arise at          

      home. ED course: Patient is alert nontoxic in appearance in the ED not hypoxic. Patient     

      advised to increase furosemide dosing. Otherwise given strict return precautions.           

      Patient understood and agrees plan of care..                                                

                                                                                                  

                                                                                             

14:03 Order name: Basic Metabolic Panel                                                       [facility] 

                                                                                             

14:03 Order name: CBC with Diff                                                               [facility] 

                                                                                             

14:03 Order name: LFT's                                                                       [facility] 

                                                                                             

14:03 Order name: Magnesium                                                                   [facility] 

                                                                                             

14:03 Order name: NT PRO-BNP                                                                  [facility] 

                                                                                             

14:03 Order name: PT-INR                                                                      [facility] 

                                                                                             

14:03 Order name: Troponin HS                                                                 [facility] 

                                                                                             

14:03 Order name: SARS RAPID                                                                  [facility] 

                                                                                             

14:45 Order name: CBC with Automated Diff; Complete Time: 14:45                               ED[facility]

                                                                                             

14:48 Order name: SARS-COV-2 Antigen Rapid; Complete Time: 14:50                              [facility]

                                                                                             

14:54 Order name: Protime (+INR); Complete Time: 14:54                                        [facility]

                                                                                             

15:02 Order name: Basic Metabolic Panel; Complete Time: 15:03                                 [facility]

                                                                                             

15:02 Order name: Liver (Hepatic) Function; Complete Time: 15:03                              [facility]

                                                                                             

15:02 Order name: Troponin High Sensitivity; Complete Time: 15:03                             [facility]

                                                                                             

14:03 Order name: XRAY Chest (1 view)                                                         [facility] 

                                                                                             

14:03 Order name: EKG; Complete Time: 14:04                                                   [facility] 

                                                                                             

14:03 Order name: Cardiac monitoring; Complete Time: 15:04                                    [facility] 

                                                                                             

14:03 Order name: EKG - Nurse/Tech; Complete Time: 15:04                                      [facility] 

                                                                                             

14:03 Order name: IV Saline Lock; Complete Time: 14:41                                        [facility] 

                                                                                             

14:03 Order name: Labs collected and sent; Complete Time: 14:41                               [facility] 

                                                                                             

14:03 Order name: O2 Per Protocol; Complete Time: 14:28                                       [facility] 

                                                                                             

14:03 Order name: O2 Sat Monitoring; Complete Time: 14:28                                     [facility] 

                                                                                             

15:02 Order name: NT PRO-BNP; Complete Time: 15:03                                            [facility]

                                                                                             

15:02 Order name: Magnesium; Complete Time: 15:03                                             [facility]

                                                                                             

15:02 Order name: RAD; Complete Time: 15:03                                                   EDMS

                                                                                             

15:03 Order name: CT Abd/Pelvis - Without Contrast                                            [facility] 

                                                                                             

15:27 Order name: CT; Complete Time: 15:31                                                    EDMS

                                                                                                  

Administered Medications:                                                                         

15:20 Drug: Lasix (furosemide) 40 mg Route: IVP; Site: right antecubital;                     ph  

18:48 Follow up: Response: No adverse reaction                                                ph  

18:28 Drug: Lasix (furosemide) 40 mg Route: IVP; Site: right antecubital;                     ll1 

18:48 Follow up: Response: No adverse reaction                                                ph  

                                                                                                  

                                                                                                  

Disposition Summary:                                                                              

23 19:25                                                                                    

Discharge Ordered                                                                                 

      Location: Home                                                                          [facility] 

      Condition: Stable                                                                       [facility] 

      Diagnosis                                                                                   

        - Edema                                                                               [facility] 

        - Renal insufficiency                                                                 [facility] 

      Followup:                                                                               m 

        - With: Private Physician                                                                  

        - When: 1 - 2 days                                                                         

        - Reason: Recheck today's complaints, Continuance of care, Re-evaluation by your           

      physician                                                                                   

      Followup:                                                                               [facility] 

        - With: Jaya Akbar MD                                                                

        - When: 1 - 2 days                                                                         

        - Reason: Recheck today's complaints, Continuance of care, Re-evaluation by your           

      physician                                                                                   

      Discharge Instructions:                                                                     

        - Discharge Summary Sheet                                                             jm 

        - Edema                                                                               [facility] 

      Forms:                                                                                      

        - Medication Reconciliation Form                                                      [facility] 

        - Thank You Letter                                                                    [facility] 

        - Antibiotic Education                                                                [facility] 

        - Prescription Opioid Use                                                             [facility] 

      Prescriptions:                                                                              

        - Lasix 20 mg Oral Tablet                                                                  

            - take 2 tablet by ORAL route 2 times per day for 3 days; 20 tablet; Refills: 0,  jmm 

      Product Selection Permitted                                                                 

Signatures:                                                                                       

Dispatcher [employer]                           Curt Orr MD MD cha Mickail, Joel, PA PA jmm Hall, Patricia, RN RN ph Lewis, Lynsay, RN                       RN   ll1                                                  

Pauline Pennington RN                       RN   ko1                                                  

                                                                                                  

**************************************************************************************************

## 2023-02-12 NOTE — RAD REPORT
EXAM DESCRIPTION:  RAD - Chest Single View - 2/12/2023 2:55 pm

 

CLINICAL HISTORY:  SOB

Chest pain.

 

COMPARISON:  Chest Single View dated 9/17/2022; Chest Pa And Lat (2 Views) dated 6/26/2020

 

FINDINGS:  Portable technique limits examination quality.

 

The lungs are grossly clear. The heart is moderately enlarged. Sternotomy wires present.

 

IMPRESSION:  No acute intrathoracic process suspected.

## 2023-02-12 NOTE — XMS REPORT
Continuity of Care Document

                          Created on:2023



Patient:DAYDAY PETER

Sex:Male

:1965

External Reference #:[id number]





Demographics







                          Address                   [address] [place] [address]

 

                          Home Phone                [phone]

 

                          Work Phone                [phone]

 

                          Mobile Phone              [phone]

 

                          Email Address             [email or web address]

 

                          Preferred Language        English

 

                          Marital Status            Unknown

 

                          Church Affiliation     Unknown

 

                          Race                      Unknown

 

                          Additional Race(s)        Unavailable



                                                    Unavailable

 

                          Ethnic Group              Unknown









Author







                          Organization              [facility]

t

 

                          Address                   [address]



                                                    [place] [address]

 

                          Phone                     [phone]









Support







                Name            Relationship    Address         Phone

 

                DANILO PETER X               [phone] [place] [address] 

 

                DANILO PETER Unavailable     [address]    [phone] [place] [address] 









Care Team Providers







                    Name                Role                [facility] Primary Care Physician 

Unavailable

 

                    SILAS WADDELL Attending Clinician Unavailable

 

                    SILAS WADDELL Attending Clinician Unavailable

 

                    1, [facility] Attending Clinician Unavailable

 

                    Silas Waddell MD Attending Clinician [phone]

 

                    Only, [facility] Test      Attending Clinician Unavailable

 

                    Doctor Unassigned, No Name Attending Clinician Unavailable

 

                    Clark Tipton   Attending Clinician Unavailable

 

                    Clark Tipton   Admitting Clinician Unavailable









Payers







           Payer Name Policy Type Policy Number Effective Date Expiration Date S

melecio RAY              [id number]  2021            



           ADMINISTRATION                       00:00:00              

 

           [employer] [place]            [id number]            



           OF [facility]                         00:00:00              







Problems







       Condition Condition Condition Status Onset  Resolution Last   Treating Co

mments 

Source



       Name   Details Category        Date   Date   Treatment Clinician        



                                                 Date                 

 

       No known No known Disease                                           Un[facility]

rs



       active active                                                  ity [facility]



       problems problems                                                  [facility]







Allergies, Adverse Reactions, Alerts







       Allergy Allergy Status Severity Reaction(s) Onset  Inactive Treating Comm

ents 

Source



       Name   Type                        Date   Date   Clinician        

 

       No Known DA     Active U             2011                      [facility]



       Allergie                             2-                        Clear



       s                                  00:00:                      [place]



                                          00                          [facility]

 

       NO KNOWN Drug   Active                                           [facility]



       ALLERGIE Class                                                   [facility]



       S                                                              [facility]







Social History







           Social Habit Start Date Stop Date  Quantity   Comments   Source

 

           Exposure to                       Not sure              [facility]

 [place]



           SARS-CoV-2 (event)                                             [facility]

[facility]

 

           Sex Assigned At 1965                       [facility]



           Birth      00:00:00   00:00:00                         [facility]









                Smoking Status  Start Date      Stop Date       Source

 

                Unknown if ever smoked                                 [facility]







Medications







       Ordered Filled Start  Stop   Current Ordering Indication Dosage Frequency

 Signature

                    Comments            Components          Source



     Medication Medication Date Date Medication? Clinician                (SIG) 

          



     Name Name                                                   

 

     ibuprofen            Yes                      ibuprofen           Uni

vers



     400 mg      9-01                               400 mg           ity of



     tablet      16:48:                               tablet           [place]



               22                                 TAKE ONE           Medical



                                                  (1)            Branch



                                                  TABLET(S)           



                                                  BY MOUTH           



                                                  EVERY FOUR           



                                                  HOURS AS           



                                                  NEEDED FOR           



                                                  PAIN.           

 

     ibuprofen            Yes                      ibuprofen           Uni

vers



     400 mg      9-01                               400 mg           ity of



     tablet      16:48:                               tablet           [place]



               22                                 TAKE ONE           Medical



                                                  (1)            Branch



                                                  TABLET(S)           



                                                  BY MOUTH           



                                                  EVERY FOUR           



                                                  HOURS AS           



                                                  NEEDED FOR           



                                                  PAIN.           

 

     ibuprofen            Yes                      ibuprofen           Uni

vers



     400 mg      9-01                               400 mg           ity of



     tablet      16:48:                               tablet           [place]



               22                                 TAKE ONE           Medical



                                                  (1)            Branch



                                                  TABLET(S)           



                                                  BY MOUTH           



                                                  EVERY FOUR           



                                                  HOURS AS           



                                                  NEEDED FOR           



                                                  PAIN.           

 

     ibuprofen            Yes                      ibuprofen           Uni

vers



     400 mg      9-01                               400 mg           ity of



     tablet      16:48:                               tablet           [place]



               22                                 TAKE ONE           Medical



                                                  (1)            Branch



                                                  TABLET(S)           



                                                  BY MOUTH           



                                                  EVERY FOUR           



                                                  HOURS AS           



                                                  NEEDED FOR           



                                                  PAIN.           

 

     ibuprofen            Yes                      ibuprofen           Uni

vers



     400 mg      9-01                               400 mg           ity of



     tablet      11:48:                               tablet           [place]



               22                                 TAKE ONE           Medical



                                                  (1)            Branch



                                                  TABLET(S)           



                                                  BY MOUTH           



                                                  EVERY FOUR           



                                                  HOURS AS           



                                                  NEEDED FOR           



                                                  PAIN.           

 

     ibuprofen            Yes                      ibuprofen           Uni

vers



     400 mg      9-01                               400 mg           ity of



     tablet      11:48:                               tablet           [place]



               22                                 TAKE ONE           Medical



                                                  (1)            Branch



                                                  TABLET(S)           



                                                  BY MOUTH           



                                                  EVERY FOUR           



                                                  HOURS AS           



                                                  NEEDED FOR           



                                                  PAIN.           

 

     ibuprofen            Yes                      ibuprofen           Uni

vers



     400 mg      9-01                               400 mg           ity of



     tablet      11:48:                               tablet           [place]



               22                                 TAKE ONE           Medical



                                                  (1)            Branch



                                                  TABLET(S)           



                                                  BY MOUTH           



                                                  EVERY FOUR           



                                                  HOURS AS           



                                                  NEEDED FOR           



                                                  PAIN.           

 

     ibuprofen            Yes                      ibuprofen           Uni

vers



     400 mg      9-01                               400 mg           ity of



     tablet      11:48:                               tablet           [place]



               22                                 TAKE ONE           Medical



                                                  (1)            Branch



                                                  TABLET(S)           



                                                  BY MOUTH           



                                                  EVERY FOUR           



                                                  HOURS AS           



                                                  NEEDED FOR           



                                                  PAIN.           

 

     losartan 25            Yes                      TAKE ONE           Un

amadeo



     mg tablet      8-27                               TABLET BY           ity o

f



               00:00:                               MOUTH           [place]



               00                                 DAILY FOR           Medical



                                                  BLOOD           Branch



                                                  PRESSURE           

 

     losartan 25            Yes                      TAKE ONE           Un

amadeo



     mg tablet      8-27                               TABLET BY           ity o

f



               00:00:                               MOUTH           [place]



               00                                 DAILY FOR           Medical



                                                  BLOOD           Branch



                                                  PRESSURE           

 

     losartan 25            Yes                      TAKE ONE           Un

amadeo



     mg tablet      8-27                               TABLET BY           ity o

f



               00:00:                               MOUTH           [place]



               00                                 DAILY FOR           Medical



                                                  BLOOD           Branch



                                                  PRESSURE           

 

     losartan 25            Yes                      TAKE ONE           Un

amadeo



     mg tablet      8-27                               TABLET BY           ity o

f



               00:00:                               MOUTH           [place]



               00                                 DAILY FOR           Medical



                                                  BLOOD           Branch



                                                  PRESSURE           

 

     losartan 25            Yes                      TAKE ONE           Un

amadeo



     mg tablet      8-27                               TABLET BY           ity o

f



               00:00:                               MOUTH           [place]



               00                                 DAILY FOR           Medical



                                                  BLOOD           Branch



                                                  PRESSURE           

 

     losartan 25            Yes                      TAKE ONE           Un

amadeo



     mg tablet      8-27                               TABLET BY           ity o

f



               00:00:                               MOUTH           [place]



               00                                 DAILY FOR           Medical



                                                  BLOOD           Branch



                                                  PRESSURE           

 

     losartan 25            Yes                      TAKE ONE           Un

amadeo



     mg tablet      8-27                               TABLET BY           ity o

f



               00:00:                               MOUTH           [place]



               00                                 DAILY FOR           Medical



                                                  BLOOD           Branch



                                                  PRESSURE           

 

     losartan 25            Yes                      TAKE ONE           Un

amadeo



     mg tablet      8-27                               TABLET BY           ity o

f



               00:00:                               MOUTH           [place]



               00                                 DAILY FOR           Medical



                                                  BLOOD           Branch



                                                  PRESSURE           

 

     metoprolol            Yes                      TAKE ONE           Uni

vers



     succinate      6-01                               TABLET BY           ity o

f



     XL 25 mg 24      00:00:                               MOUTH           [place]



     hr tablet      00                                 DAILY FOR           Medic

al



                                                  HEART/BLOO           Branch



                                                  D              



                                                  PRESSURE.           



                                                  *DO NOT           



                                                  CRUSH*           

 

     ergocalcife            Yes                      TAKE ONE           Un

amadeo



     rol,      6-01                               CAPSULE BY           ity of



     vitamin d2,      00:00:                               MOUTH           [place]



     1,250 mcg      00                                 EVERY WEEK           Medi

[facility]ad[facility]



     (50,000                                         *DO NOT           Branch



     unit)                                         CRUSH*           



     capsule                                                        

 

     metformin            Yes                      TAKE ONE           Univ

ers



      mg      6-01                               TABLET BY           ity o

f



     24 hr      00:00:                               MOUTH           [place]



     tablet      00                                 DAILY FOR           Medical



                                                  DIABETES           Branch



                                                  *DO NOT           



                                                  CRUSH*           

 

     metoprolol            Yes                      TAKE ONE           Uni

vers



     succinate      6-01                               TABLET BY           ity o

f



     XL 25 mg 24      00:00:                               MOUTH           [place]



     hr tablet      00                                 DAILY FOR           Medic

al



                                                  HEART/BLOO           Branch



                                                  D              



                                                  PRESSURE.           



                                                  *DO NOT           



                                                  CRUSH*           

 

     ergocalcife            Yes                      TAKE ONE           Un

amadeo



     rol,      6-01                               CAPSULE BY           ity of



     vitamin d2,      00:00:                               MOUTH           [place]



     1,250 mcg      00                                 EVERY WEEK           Medi

amad[facility]



     (50,000                                         *DO NOT           Branch



     unit)                                         CRUSH*           



     capsule                                                        

 

     metformin            Yes                      TAKE ONE           Univ

ers



      mg      6-01                               TABLET BY           ity o

f



     24 hr      00:00:                               MOUTH           [place]



     tablet      00                                 DAILY FOR           Medical



                                                  DIABETES           Branch



                                                  *DO NOT           



                                                  CRUSH*           

 

     metoprolol            Yes                      TAKE ONE           Uni

vers



     succinate      6-01                               TABLET BY           ity o

f



     XL 25 mg 24      00:00:                               MOUTH           [place]



     hr tablet      00                                 DAILY FOR           Medic

al



                                                  HEART/BLOO           Branch



                                                  D              



                                                  PRESSURE.           



                                                  *DO NOT           



                                                  CRUSH*           

 

     ergocalcife            Yes                      TAKE ONE           Un

amadeo



     rol,      6-01                               CAPSULE BY           ity of



     vitamin d2,      00:00:                               MOUTH           [place]



     1,250 mcg      00                                 EVERY WEEK           Medi

amad[facility]



     (50,000                                         *DO NOT           Branch



     unit)                                         CRUSH*           



     capsule                                                        

 

     metformin            Yes                      TAKE ONE           Univ

ers



      mg      6-01                               TABLET BY           ity o

f



     24 hr      00:00:                               MOUTH           [place]



     tablet      00                                 DAILY FOR           Medical



                                                  DIABETES           Branch



                                                  *DO NOT           



                                                  CRUSH*           

 

     metoprolol            Yes                      TAKE ONE           Uni

vers



     succinate      6-01                               TABLET BY           ity o

f



     XL 25 mg 24      00:00:                               MOUTH           [place]



     hr tablet      00                                 DAILY FOR           Medic

al



                                                  HEART/BLOO           Branch



                                                  D              



                                                  PRESSURE.           



                                                  *DO NOT           



                                                  CRUSH*           

 

     ergocalcife            Yes                      TAKE ONE           Un

amadeo



     rol,      6-01                               CAPSULE BY           ity of



     vitamin d2,      00:00:                               MOUTH           [place]



     1,250 mcg      00                                 EVERY WEEK           Medi

[facility]ad[facility]



     (50,000                                         *DO NOT           Branch



     unit)                                         CRUSH*           



     capsule                                                        

 

     metformin            Yes                      TAKE ONE           Univ

ers



      mg      6-01                               TABLET BY           ity o

f



     24 hr      00:00:                               MOUTH           [place]



     tablet      00                                 DAILY FOR           Medical



                                                  DIABETES           Branch



                                                  *DO NOT           



                                                  CRUSH*           

 

     metoprolol            Yes                      TAKE ONE           Uni

vers



     succinate      6-01                               TABLET BY           ity o

f



     XL 25 mg 24      00:00:                               MOUTH           [place]



     hr tablet      00                                 DAILY FOR           Medic

al



                                                  HEART/BLOO           Branch



                                                  D              



                                                  PRESSURE.           



                                                  *DO NOT           



                                                  CRUSH*           

 

     ergocalcife            Yes                      TAKE ONE           Un

amadeo



     rol,      6-01                               CAPSULE BY           ity of



     vitamin d2,      00:00:                               MOUTH           [place]



     1,250 mcg      00                                 EVERY WEEK           Medi

amad[facility]



     (50,000                                         *DO NOT           Branch



     unit)                                         CRUSH*           



     capsule                                                        

 

     metformin            Yes                      TAKE ONE           Univ

ers



      mg      6-01                               TABLET BY           ity o

f



     24 hr      00:00:                               MOUTH           [place]



     tablet      00                                 DAILY FOR           Medical



                                                  DIABETES           Branch



                                                  *DO NOT           



                                                  CRUSH*           

 

     metoprolol            Yes                      TAKE ONE           Uni

vers



     succinate      6-01                               TABLET BY           ity o

f



     XL 25 mg 24      00:00:                               MOUTH           [place]



     hr tablet      00                                 DAILY FOR           Medic

al



                                                  HEART/BLOO           Branch



                                                  D              



                                                  PRESSURE.           



                                                  *DO NOT           



                                                  CRUSH*           

 

     ergocalcife            Yes                      TAKE ONE           Un

[facility]



     rol,      6-01                               CAPSULE BY           ity of



     vitamin d2,      00:00:                               MOUTH           [place]



     1,250 mcg      00                                 EVERY WEEK           Medi

[facility]



     (50,000                                         *DO NOT           Branch



     unit)                                         CRUSH*           



     capsule                                                        

 

     metformin            Yes                      TAKE ONE           Univ

ers



      mg      6-01                               TABLET BY           ity o

f



     24 hr      00:00:                               MOUTH           [place]



     tablet      00                                 DAILY FOR           Medical



                                                  DIABETES           Branch



                                                  *DO NOT           



                                                  CRUSH*           

 

     metoprolol            Yes                      TAKE ONE           Uni

vers



     succinate      6-01                               TABLET BY           ity o

f



     XL 25 mg 24      00:00:                               MOUTH           [place]



     hr tablet      00                                 DAILY FOR           Medic

al



                                                  HEART/BLOO           Branch



                                                  D              



                                                  PRESSURE.           



                                                  *DO NOT           



                                                  CRUSH*           

 

     ergocalcife            Yes                      TAKE ONE           Un

[facility]



     rol,      6-01                               CAPSULE BY           ity of



     vitamin d2,      00:00:                               MOUTH           [place]



     1,250 mcg      00                                 EVERY WEEK           Medi

[facility]



     (50,000                                         *DO NOT           Branch



     unit)                                         CRUSH*           



     capsule                                                        

 

     metformin            Yes                      TAKE ONE           Univ

ers



      mg      6-01                               TABLET BY           ity o

f



     24 hr      00:00:                               MOUTH           [place]



     tablet      00                                 DAILY FOR           Medical



                                                  DIABETES           Branch



                                                  *DO NOT           



                                                  CRUSH*           

 

     metoprolol            Yes                      TAKE ONE           Uni

vers



     succinate      6-01                               TABLET BY           ity o

f



     XL 25 mg 24      00:00:                               MOUTH           [place]



     hr tablet      00                                 DAILY FOR           Medic

al



                                                  HEART/BLOO           Branch



                                                  D              



                                                  PRESSURE.           



                                                  *DO NOT           



                                                  CRUSH*           

 

     ergocalcife            Yes                      TAKE ONE           Un

[facility]



     rol,      6-01                               CAPSULE BY           ity of



     vitamin d2,      00:00:                               MOUTH           [place]



     1,250 mcg      00                                 EVERY WEEK           Medi

[facility]



     (50,000                                         *DO NOT           Branch



     unit)                                         CRUSH*           



     capsule                                                        

 

     metformin            Yes                      TAKE ONE           Univ

ers



      mg      6-01                               TABLET BY           ity o

f



     24 hr      00:00:                               MOUTH           [place]



     tablet      00                                 DAILY FOR           Medical



                                                  DIABETES           Branch



                                                  *DO NOT           



                                                  CRUSH*           

 

     allopurinoL      2020      Yes                      TAKE ONE           Un

amadeo



     100 mg      1-23                               TABLET BY           ity of



     tablet      00:00:                               MOUTH           [place]



                                                DAILY FOR           Medical



                                                  GOUT           Branch

 

     atorvastati      2020-      Yes                      TAKE           Univer

s



     n 80 mg      1-23                               ONE-HALF           ity of



     tablet      00:00:                               TABLET BY           [address] AT           [facility]



                                                  BEDTIME           [place]



                                                  FOR            



                                                  CHOLESTERO           



                                                  L              

 

     glipiZIDE      2020      Yes                      TAKE           [facility]



     10 mg      1-23                               ONE-HALF           ity of



     tablet      00:00:                               TABLET BY           [address]           Medical



                                                  TWICE A           Branch



                                                  DAY FOR           



                                                  DIABETES           

 

     allopurinoL      2020      Yes                      TAKE ONE           Un

am[facility]



     100 mg      1-23                               TABLET BY           ity of



     tablet      00:00:                               MOUTH           [place]



                                                DAILY FOR           Medical



                                                  GOUT           Branch

 

     atorvastati      2020      Yes                      TAKE           Univer

s



     n 80 mg      1-23                               ONE-HALF           ity of



     tablet      00:00:                               TABLET BY           [address] AT           [facility]



                                                  BEDTIME           [place]



                                                  FOR            



                                                  CHOLESTERO           



                                                  L              

 

     glipiZIDE      2020      Yes                      TAKE           [facility]



     10 mg      1-23                               ONE-HALF           ity of



     tablet      00:00:                               TABLET BY           [address]



                                                  TWICE A           Branch



                                                  DAY FOR           



                                                  DIABETES           

 

     allopurinoL      2020      Yes                      TAKE ONE           Un

am[facility]



     100 mg      1-23                               TABLET BY           ity of



     tablet      00:00:                               MOUTH           [place]



                                                DAILY FOR           Medical



                                                  GOUT           Branch

 

     atorvastati      2020      Yes                      TAKE           Univer

s



     n 80 mg      1-23                               ONE-HALF           ity of



     tablet      00:00:                               TABLET BY           [address] AT           [facility]



                                                  FOR            



                                                  CHOLESTERO           



                                                  L              

 

     glipiZIDE      2020      Yes                      TAKE           [facility]



     10 mg      1-23                               ONE-HALF           ity of



     tablet      00:00:                               TABLET BY           [address]



                                                  TWICE A           Branch



                                                  DAY FOR           



                                                  DIABETES           

 

     allopurinoL      2020      Yes                      TAKE ONE           Un

am[facility]o



     100 mg      1-23                               TABLET BY           ity of



     tablet      00:00:                               MOUTH           [place]



                                                DAILY FOR           Medical



                                                  GOUT           Branch

 

     atorvastati      -      Yes                      TAKE           Univer

s



     n 80 mg      1-23                               ONE-HALF           ity of



     tablet      00:00:                               TABLET BY           [address] AT           [facility]



                                                  BEDTIME           [place]



                                                  FOR            



                                                  CHOLESTERO           



                                                  L              

 

     glipiZIDE      2020-      Yes                      TAKE           [facility]



     10 mg      1-23                               ONE-HALF           ity of



     tablet      00:00:                               TABLET BY           [address]           Medical



                                                  TWICE A           Branch



                                                  DAY FOR           



                                                  DIABETES           

 

     allopurinoL      2020      Yes                      TAKE ONE           Un

[facility]o



     100 mg      1-23                               TABLET BY           ity of



     tablet      00:00:                               MOUTH           [place]



                                                DAILY FOR           Medical



                                                  GOUT           Branch

 

     atorvastati      -      Yes                      TAKE           Univer

s



     n 80 mg      1-23                               ONE-HALF           ity of



     tablet      00:00:                               TABLET BY           [address] AT           [facility]



                                                  BEDTIME           [place]



                                                  FOR            



                                                  CHOLESTERO           



                                                  L              

 

     glipiZIDE      2020      Yes                      TAKE           [facility]



     10 mg      1-23                               ONE-HALF           ity of



     tablet      00:00:                               TABLET BY           [address]



                                                  TWICE A           Branch



                                                  DAY FOR           



                                                  DIABETES           

 

     allopurinoL      2020      Yes                      TAKE ONE           Un

[facility]



     100 mg      1-23                               TABLET BY           ity of



     tablet      00:00:                               MOUTH           [place]



                                                DAILY FOR           Medical



                                                  GOUT           Branch

 

     atorvastati      2020      Yes                      TAKE           Univer

s



     n 80 mg      1-23                               ONE-HALF           ity of



     tablet      00:00:                               TABLET BY           [address] AT           [facility]



                                                  FOR            



                                                  CHOLESTERO           



                                                  L              

 

     glipiZIDE      2020      Yes                      TAKE           [facility]



     10 mg      1-23                               ONE-HALF           ity of



     tablet      00:00:                               TABLET BY           [address]



                                                  TWICE A           Branch



                                                  DAY FOR           



                                                  DIABETES           

 

     allopurinoL      2020      Yes                      TAKE ONE           Un

am[facility]o



     100 mg      1-23                               TABLET BY           ity of



     tablet      00:00:                               MOUTH           [place]



                                                DAILY FOR           Medical



                                                  GOUT           Branch

 

     atorvastati      2020      Yes                      TAKE           Univer

s



     n 80 mg      1-23                               ONE-HALF           ity of



     tablet      00:00:                               TABLET BY           [address] AT           [facility]



                                                  FOR            



                                                  CHOLESTERO           



                                                  L              

 

     glipiZIDE      2020      Yes                      TAKE           [facility]



     10 mg      1-23                               ONE-HALF           ity of



     tablet      00:00:                               TABLET BY           [address]



                                                  TWICE A           Branch



                                                  DAY FOR           



                                                  DIABETES           

 

     allopurinoL      2020      Yes                      TAKE ONE           Un

am[facility]o



     100 mg      1-23                               TABLET BY           ity of



     tablet      00:00:                               MOUTH           [place]



                                                DAILY FOR           Medical



                                                  GOUT           Branch

 

     atorvastati      2020      Yes                      TAKE           Univer

s



     n 80 mg      1-23                               ONE-HALF           ity of



     tablet      00:00:                               TABLET BY           [address] AT           [facility]



                                                  FOR            



                                                  CHOLESTERO           



                                                  L              

 

     glipiZIDE      2020      Yes                      TAKE           [facility]



     10 mg      1-23                               ONE-HALF           ity of



     tablet      00:00:                               TABLET BY           [address]



                                                  TWICE A           Branch



                                                  DAY FOR           



                                                  DIABETES           

 

     No known                No                                      [facility]



     medications                                                        ity of



                                                                 [facility]

 

     No known                No                                      [facility]



     medications                                                        ity of



                                                                 [facility]

 

     No known                No                                      [facility]



     medications                                                        it of



                                                                 [facility]







Vital Signs







             Vital Name   Observation Time Observation Value Comments     Source

 

             Systolic blood 2021 16:46:00 119 mm[Hg]                Univer

sity of



             pressure                                            [facility]

 

             Diastolic blood 2021 16:46:00 70 mm[Hg]                 Unive

rsity of



             pressure                                            [facility]

 

             Heart rate   2021 16:46:00 83 /min                   [facility]

ty of



                                                                 [facility]



                                                                 [place]

 

             Body temperature 2021 16:46:00 36.06 Johanna                 Univ

ersity of



                                                                 [place] [facility]



                                                                 [facility]

 

             Respiratory rate 2021 16:46:00 18 /min                   Univ

ersity of



                                                                 [facility]



                                                                 [place]

 

             Body height  2021 16:46:00 167.6 cm                  [facility]

ty of



                                                                 [place] [facility]



                                                                 [place]

 

             Body weight  2021 16:46:00 84.505 kg                 [facility]

ty of



                                                                 [place] [facility]



                                                                 [facility]

 

             BMI          2021 16:46:00 30.07 kg/m2               [facility]

ty of



                                                                 [facility]

 

             Oxygen saturation in 2021 16:46:00 97 /min                   

[facility]



             Arterial blood by                                        [place] [facility]

[facility]



             Pulse oximetry                                        [facility]

 

             Systolic blood 2021 14:01:00 124 mm[Hg]                Univer

sity of



             pressure                                            [facility]

 

             Diastolic blood 2021 14:01:00 73 mm[Hg]                 Unive

rsity of



             pressure                                            [facility]

 

             Heart rate   2021 14:01:00 85 /min                   [facility]

ty of



                                                                 [facility]



                                                                 [place]

 

             Body temperature 2021 14:01:00 36.28 Johanna                 Univ

ersity of



                                                                 [facility]

 

             Respiratory rate 2021 14:01:00 18 /min                   Univ

ersity of



                                                                 [facility]



                                                                 [place]

 

             Body height  2021 14:01:00 167.6 cm                  [facility]

ty of



                                                                 [place] [facility]



                                                                 [place]

 

             Body weight  2021 14:01:00 83.28 kg                  [facility]

ty of



                                                                 [place] [facility]



                                                                 [place]

 

             BMI          2021 14:01:00 29.63 kg/m2               Univers[facility]

ty of



                                                                 [place] [facility]



                                                                 [place]

 

             Oxygen saturation in 2021 14:01:00 98 /min                   

[facility]



             Arterial blood by                                        [place] [facility]

[facility]



             Pulse oximetry                                        [facility]







Procedures







                Procedure       Date / Time     Performing Clinician Source



                                Performed                       

 

                ASSIGNMENT OF BENEFITS 2021-10-08 14:49:44 Doctor Unassigned, No

 [facility]

 

                DME/SUPPLY JUSTIFICATION 2021 05:01:00 Doctor Unassigned, 

No [facility]

 

                VACCINATIONS - CONSENTS, 2021 05:01:00 Doctor Unassigned, 

No [facility]



                ELIGIBILITY, HISTORY                 Name            Medical Bra

Formerly [facility]







Encounters







        Start   End     Encounter Admission Attending Care    Care    Encounter 

Source



        Date/Time Date/Time Type    Type    Clinicians Facility Department ID   

   

 

        2021 Outpatient R       SILAS WADDELL [facility] 

   [id number] 

[facility]



        10:20:00 10:20:00                 OSEAS WADDELLTACO                     

    [facility]kar[facility]

 

        2021-10-11 2021-10-11 Outpatient R       OSEAS WADDELLTACO [facility] 

   [id number] 

[facility]



        19:30:00 19:30:00                 SILAS WADDELL                     

    [facility]

 

        2021-10-11 2021-10-11 Technician         1, [facility] Sleep [facility] Bed [facility]    1.

[id number]

                                        [facility]



        14:11:49 16:41:49 Visit           ZiyadmartcyndeeRamónshe BETH Solon [id number].

10         ity of



                                                Marianna [id number]         [facility]  [phone]         Medi

[facility]



                                                        [address]             Branch

 

        2021-10-08 2021-10-08 Laboratory         Only, [facility] Test [facility]    [id number]

[id number] 

Univers



        09:55:41 10:10:41 Only            Beena Ramónshe BETH Solon 350.1.13.

10         ity of



                                                Marianna [id number]         [facility]  [phone]         Medi

amadou



                                                        353             Branch

 

        2021-10-08 2021-10-08 Outpatient R               [facility]    [id number]

[id number] Univers



        10:00:00 10:00:00                                                 ity of



                                                                        [facility]

 

        2021-10-08 2021-10-08 Orders          Doctor SAUER    1.2.840.114 [id number]

41 Univers



        00:00:00 00:00:00 Only            Unassigned, EVELIO   350.1.13.10       

  ity of



                                        [facility] [id number]         Greg

as



                                                        [phone]         Medi

amadou



                                                        009             Branch

 

        2021 Orders          Doctor SAUER    1.2.840.114 [id number]

30 Univers



        00:00:00 00:00:00 Only            Unassigned, EVELIO   350.1.13.10       

  ity of



                                        [facility] [id number]         Greg

as



                                                        [phone]         Medi

amadou



                                                        009             Branch

 

        2021 Office          [facility]    [id number]

0174 Univers



        11:36:32 11:56:32 Visit           Silas [place] 350.1.13.10        

 ity of



                                                [place] [id number]         [place]

[place]



                                                [place] [phone]         Me

dical



                                                nal     085             [facility]                 

 

        2021 Outpatient R       RAMÓN WADDELLHIL [facility] 

   [id number] 

Univers



        11:40:00 11:40:00                 ATANASOV, STRAHIL                     

    ity [facility]

 

        2021 Outpatient R       RAMÓN WADDELLHIL [facility] 

   [id number] 

Univers



        19:30:00 19:30:00                 BEENA STRAHIL                     

    ity [facility]

 

        2021 Technician         1, [facility]    1.

2.840.114 [id number]

                                        Univers



        15:15:54 17:45:54 Visit           Silas Waddell 350.1.13.

10         ity of



                                                [place] [id number]         [place]

[place]



                                                [place]  [phone]         Medi

amadou



                                                        193             [place]

 

        2021 Orders          Doctor  CRISTIANE    1.2.840.114 [id number]

56 Univers



        00:00:00 00:00:00 Only            Unassigned, EVELIO   350.1.13.10       

  ity of



                                        [facility] [id number]         Greg

as



                                                        [phone]         [address]

 

        2021 Office          [facility]    [id number]

3268 Univers



        08:48:47 09:42:06 Visit           Oseasl KIRIT Solon 350.1.13.10        

 ity of



                                                [place] [id number]         [place]

[place]



                                                [place] [phone]         Me

dical



                                                nal     085             [facility]                 

 

        2021 Outpatient R       BEENA STRAHIL [facility] 

   [id number] 

Univers



        09:30:00 09:30:00                 ATANASOV, STRAHIL                     

    ity [facility]

 

        2019 Inpatient UR      Saeed  [facility].01 [id number]

70 [facility]



        16:29:00 22:36:00                 Clark Vargas      [facility]







Results







           Test Description Test Time  Test Comments Results    Result Comments 

Source

 

           SURG       2019            ---------------------            



                      12:56:00              ---------------------            



                                            ---------------------            



                                            ---------------------            



                                            --------RUN DATE:            



                                            19  [facility] - LAB            



                                            *LIVE* PAGE 1 RUN            



                                            TIME: 1256 Specimen            



                                            Inquiry RUN USER:            



                                            INTERFACE             



                                            ---------------------            



                                            ---------------------            



                                            ---------------------            



                                            ---------------------            



                                            --------PATIENT:            



                                            DAYDAY PETER            



                                            ACCT #: [id number]            



                                            LOC: L. U #:            



                                            [id number] AGE/SX:            



                                            54/M ROOM: [facility]            



                                            [facility] DR:            



                                            Clark Tipton MD            



                                            : 63 BED: 1            



                                            DIS: 19            



                                            STATUS: DIS Александр TLOC:            



                                            ---------------------            



                                            ---------------------            



                                            ---------------------            



                                            ---------------------            



                                            -------- SPEC #:            



                                            PMC:[id number] RECD:            



                                            [id number] STATUS:            



                                            BEBO SOUZA #: [id number]            



                                            MOY:             



                                            [facility] DR:              



                                            Clark Tipton MD            



                                            ENTERED:              



                                             SP            



                                            TYPE: SURG  OTHR DR:            



                                            No Primary or Family            



                                            Physician Self            



                                            Referred Issac Bailon MDORDERED: SURG PATH            



                                            LVL 3 COPIES TO: No            



                                            Primary or Family            



                                            Physician Self            



                                            Referred              



                                            Clark Tipton MD            



                                            [address] [address]            



                                            [place] [address] [phone]            



                                            [email or web address] Issac Bailon MD            



                                            [address]            



                                            [address] [place] [address] [phone]            



                                            HISTOLOGY: TISSUE  ID            



                                            BLK PCS ABIGAIL LEV            



                                            PROCEDURE DISPOSITION            



                                            _______________ ____            



                                            ______ ___ ___ ___            



                                            _______________            



                                            ___________            



                                            GALLBLADDER, NO A 1-2            



                                            1 PROCEDURES: SURG            



                                            PATH LVL 3            



                                            ()            



                                            TISSUES: A.            



                                            GALLBLADDER, NOS -            



                                            GALLBLADDER CLINICAL            



                                            HISTORY ABD PAIN CPT            



                                            CODES CPT CODE(S):            



                                            60264 , ,  , , , , **            



                                            CONTINUED ON NEXT            



                                            PAGE **               



                                            ---------------------            



                                            ---------------------            



                                            ---------------------            



                                            ---------------------            



                                            --------RUN DATE:            



                                            19 [facility] - LAB            



                                            *LIVE* PAGE 2 RUN            



                                            TIME: 1256 Specimen            



                                            Inquiry RUN USER:            



                                            INTERFACE             



                                            ---------------------            



                                            ---------------------            



                                            ---------------------            



                                            ---------------------            



                                            --------SPEC #:            



                                            PMC:[id number] PATIENT:            



                                            DAYDAY PETER            



                                            #[id number]            



                                            (Continued)----------            



                                            ---------------------            



                                            ---------------------            



                                            ---------------------            



                                            -------------------            



                                            FINAL DIAGNOSIS            



                                            Gallbladder,            



                                            laparoscopic            



                                            cholecystectomy: MILD            



                                            CHRONIC CHOLECYSTITIS            



                                            WITH CHOLELITHIASIS            



                                            GROSS DESCRIPTION            



                                            Gallbladder. Received            



                                            in formalin is a            



                                            collapsed             



                                            gallbladder, 8.5 x            



                                            2.7 x 2.0 cm with a            



                                            wall, 0.3 cm in            



                                            average thickness and            



                                            a defect in the body            



                                            extending to the            



                                            gallbladder neck, 3.0            



                                            cm. The mucosa is            



                                            brown-green, smooth            



                                            and velvety. The            



                                            lumen contains scanty            



                                            bile and numerous            



                                            black friable            



                                            calculi, 2.0 x 1.7 x            



                                            0.3 cm in aggregate.            



                                            Five additional            



                                            similar calculi are            



                                            present in the            



                                            container measuring            



                                            1.0 x 0.7 x 0.3 cm in            



                                            aggregate.            



                                            Representative            



                                            section submitted as            



                                            A. /ba/pdb Grossing            



                                            performed at [facility]            



                                            Pathology, [address]            



                                            [facility], [address],            



                                            [place] [address].            



                                            Medical Director:            



                                            Champ Dooley M.D.            



                                            MICROSCOPIC            



                                            DESCRIPTION            



                                            Gallbladder. Sections            



                                            demonstrate            



                                            gallbladder wall with            



                                            glandular mucosa.            



                                            Focal dilated            



                                            Rokitansky-Aschoff            



                                            sinuses are            



                                            identified. There is            



                                            mild patchy chronic            



                                            inflammation in the            



                                            wall of the            



                                            gallbladder. There is            



                                            no evidence of            



                                            dysplasia or            



                                            malignancy.            



                                            /cm------------------            



                                            ---------------------            



                                            ---------------------            



                                            ---------------------            



                                            ----------- Signed            



                                            SIGNATURE ON FILE            



                                            Marry Freeman            



                                            19 [id number]            



                                            ---------------------            



                                            ---------------------            



                                            ---------------------            



                                            ---------------------            



                                            --------  ** END OF            



                                            REPORT **             









                    COMPREHENSIVE METABOLIC PANEL 2019 18:11:00 









                      Test Item  Value      Reference Range Interpretation Comme

nts









             SODIUM (test code = NA) 137 mmol/L   134-147      N            

 

             POTASSIUM (test code = K) 4.4 mmol/L   3.4-5.0      N            

 

             CHLORIDE (test code = CL) 107 mmol/L   100-108      N            

 

             CARBON DIOXIDE (test code = CO2) 23 mmol/L    21-32        N       

     

 

             ANION GAP (test code = GAP) 7.0 GAP calc 4.0-15.0     N            

 

             GLUCOSE (test code = GLU) 239 MG/DL           H            

 

             BLOOD UREA NITROGEN (test code = BUN) 10 MG/DL     7-18         N  

          

 

             GLOMERULAR FILTRATION RATE (test code = GFR) >=60 max estimate estG

FR >60                       

 

             CREATININE (test code = CREAT) 1.3 MG/DL    0.8-1.3      N         

   

 

             TOTAL PROTEIN (test code = PROT) 7.1 G/DL     6.4-8.2      N       

     

 

             ALBUMIN (test code = ALB) 3.2 G/DL     3.4-5.0      L            

 

             GLOBULIN (test code = GLOB) 3.9 GM/dL                              

 

             ALBUMIN/GLOBULIN RATIO (test code = A/G) 0.8 RATIO    1.2-2.2      

L            

 

             CALCIUM (test code = CA) 7.9 MG/DL    8.5-10.1     L            

 

             BILIRUBIN TOTAL (test code = BILT) 1.20 MG/DL   0.2-1.2      N     

       

 

             SGOT/AST (test code = AST) 91 Unit/L    15-37        H            

 

             SGPT/ALT (test code = ALT) 139 Unit/L   12-78        H            

 

             ALKALINE PHOSPHATASE TOTAL (test code = ALKP) 81 Unit/L      

     N            



GLUCOSE BEDSIDE [id number] 16:44:00





             Test Item    Value        Reference Range Interpretation Comments

 

             GLUCOSE BEDSIDE TESTING (test code 147 mg/dL           H     

       



             = GLUBED)                                           



CBC W/AUTO [id number] 16:07:00





             Test Item    Value        Reference Range Interpretation Comments

 

             WHITE BLOOD CELL (test code = 8.3 K/mm3    3.5-11.0     N          

  



             WBC)                                                

 

             RED BLOOD CELL (test code = RBC) 4.92 M/mm3   4.70-6.10    N       

     

 

             HEMOGLOBIN (test code = HGB) 14.0 G/DL    12.3-15.9    N           

 

 

             HEMATOCRIT (test code = HCT) 41.5 %       35.8-46.7    N           

 

 

             MEAN CELL VOLUME (test code = 84.3 Fl      86.3-98.9    L          

  



             MCV)                                                

 

             MEAN CELL HGB (test code = MCH) 28.5 pg      28.9-34.4    L        

    

 

             MEAN CELL HGB CONCETRATION (test 33.7 G/DL    32.1-34.5    N       

     



             code = MCHC)                                        

 

             RED CELL DISTRIBUTION WIDTH (test 13.2 SD      11.5-14.5    N      

      



             code = RDW)                                         

 

             PLATELET COUNT (test code = PLT) 253.0 K/mm3  150-450      N       

     

 

             MEAN PLATELET VOLUME (test code = 10.30 fL     7.0-9.6      H      

      



             MPV)                                                

 

             NEUTROPHIL % (test code = NT%) 67.6 %       40-76                  

   

 

             LYMPHOCYTE % (test code = LY%) 21.1 %       20.5-51.1    N         

   

 

             MONOCYTE % (test code = MO%) 8.0 %        1.7-9.3      N           

 

 

             EOSINOPHIL % (test code = EO%) 2.7 %        0.0-6.0      N         

   

 

             BASOPHIL % (test code = BA%) 0.6 %        0.0-2.0      N           

 

 

             NEUTROPHIL # (test code = NT#) 5.59 K/mm3   1.8-7.6      N         

   

 

             LYMPHOCYTE # (test code = LY#) 1.7 K/mm3    0.6-3.0      N         

   

 

             MONOCYTE # (test code = MO#) 0.7 K/mm3    0.2-1.5      N           

 

 

             EOSINOPHIL # (test code = EO#) 0.2 K/mm3    0.0-0.4      N         

   

 

             BASOPHIL # (test code = BA#) 0.1 K/mm3    0.0-0.2      N           

 

 

             MANUAL DIFF REQUIRED (test code = NO DIFF/SCN  CRITERIA            

      



             MDIFF)                                              



GLUCOSE BEDSIDE [id number] 13:00:00





             Test Item    Value        Reference Range Interpretation Comments

 

             GLUCOSE BEDSIDE TESTING (test code 143 mg/dL           H     

       



             = GLUBED)                                           



GLUCOSE BEDSIDE [id number] 10:06:00





             Test Item    Value        Reference Range Interpretation Comments

 

             GLUCOSE BEDSIDE TESTING (test code 113 mg/dL           H     

       



             = GLUBED)                                           



GLUCOSE BEDSIDE [id number] 07:51:00





             Test Item    Value        Reference Range Interpretation Comments

 

             GLUCOSE BEDSIDE TESTING (test code 131 mg/dL           H     

       



             = GLUBED)                                           



COMPREHENSIVE METABOLIC [id number] 07:11:00





             Test Item    Value        Reference Range Interpretation Comments

 

             SODIUM (test code = NA) 141 mmol/L   134-147      N            

 

             POTASSIUM (test code = 3.9 mmol/L   3.4-5.0      N            



             K)                                                  

 

             CHLORIDE (test code = 110 mmol/L   100-108      H            



             CL)                                                 

 

             CARBON DIOXIDE (test 25 mmol/L    21-32        N            



             code = CO2)                                         

 

             ANION GAP (test code = 6.0 GAP calc 4.0-15.0     N            



             GAP)                                                

 

             GLUCOSE (test code = 123 MG/DL           H            



             GLU)                                                

 

             BLOOD UREA NITROGEN 10 MG/DL     7-18         N            



             (test code = BUN)                                        

 

             GLOMERULAR FILTRATION >=60 max estimate >60                       



             RATE (test code = GFR) estGFR                                 

 

             CREATININE (test code = 1.3 MG/DL    0.8-1.3      N            



             CREAT)                                              

 

             TOTAL PROTEIN (test code 7.3 G/DL     6.4-8.2      N            



             = PROT)                                             

 

             ALBUMIN (test code = 3.5 G/DL     3.4-5.0      N            



             ALB)                                                

 

             GLOBULIN (test code = 3.8 GM/dL                              



             GLOB)                                               

 

             ALBUMIN/GLOBULIN RATIO 0.9 RATIO    1.2-2.2      L            



             (test code = A/G)                                        

 

             CALCIUM (test code = CA) 8.3 MG/DL    8.5-10.1     L            

 

             BILIRUBIN TOTAL (test 1.40 MG/DL   0.2-1.2      H            



             code = BILT)                                        

 

             SGOT/AST (test code = 102 Unit/L   15-37        H            



             AST)                                                

 

             SGPT/ALT (test code = 151 Unit/L   12-78        H            



             ALT)                                                

 

             ALKALINE PHOSPHATASE 99 Unit/L           N            



             TOTAL (test code = ALKP)                                        



COMPREHENSIVE METABOLIC [id number] 06:59:00





             Test Item    Value        Reference Range Interpretation Comments

 

             SODIUM (test code = NA) 141 mmol/L   134-147      N            

 

             POTASSIUM (test code = K) 3.9 mmol/L   3.4-5.0      N            

 

             CHLORIDE (test code = CL) 110 mmol/L   100-108      H            

 

             CARBON DIOXIDE (test code = CO2) 25 mmol/L    21-32        N       

     

 

             ANION GAP (test code = GAP) 6.0 GAP calc 4.0-15.0     N            

 

             GLUCOSE (test code = GLU) 123 MG/DL           H            

 

             BLOOD UREA NITROGEN (test code = 10 MG/DL     7-18         N       

     



             BUN)                                                

 

             GLOMERULAR FILTRATION RATE (test  estGFR      >60                  

     



             code = GFR)                                         

 

             CREATININE (test code = CREAT)  MG/DL       0.8-1.3                

   

 

             TOTAL PROTEIN (test code = PROT)  G/DL        6.4-8.2              

     

 

             ALBUMIN (test code = ALB)  G/DL        3.4-5.0                   

 

             GLOBULIN (test code = GLOB)  GM/dL                                 

 

             ALBUMIN/GLOBULIN RATIO (test  RATIO       1.2-2.2                  

 



             code = A/G)                                         

 

             CALCIUM (test code = CA) 8.3 MG/DL    8.5-10.1     L            

 

             BILIRUBIN TOTAL (test code =  MG/DL       0.2-1.2                  

 



             BILT)                                               

 

             SGOT/AST (test code = AST)  Unit/L      15-37                     

 

             SGPT/ALT (test code = ALT)  Unit/L      12-78                     

 

             ALKALINE PHOSPHATASE TOTAL (test  Unit/L                     

     



             code = ALKP)                                        



CBC W/AUTO [id number] 06:53:00





             Test Item    Value        Reference Range Interpretation Comments

 

             WHITE BLOOD CELL (test code = 8.3 K/mm3    3.5-11.0     N          

  



             WBC)                                                

 

             RED BLOOD CELL (test code = RBC) 4.92 M/mm3   4.70-6.10    N       

     

 

             HEMOGLOBIN (test code = HGB) 14.0 G/DL    12.3-15.9    N           

 

 

             HEMATOCRIT (test code = HCT) 41.5 %       35.8-46.7    N           

 

 

             MEAN CELL VOLUME (test code = 84.3 Fl      86.3-98.9    L          

  



             MCV)                                                

 

             MEAN CELL HGB (test code = MCH) 28.5 pg      28.9-34.4    L        

    

 

             MEAN CELL HGB CONCETRATION (test 33.7 G/DL    32.1-34.5    N       

     



             code = MCHC)                                        

 

             RED CELL DISTRIBUTION WIDTH (test 13.2 SD      11.5-14.5    N      

      



             code = RDW)                                         

 

             PLATELET COUNT (test code = PLT) 253.0 K/mm3  150-450      N       

     

 

             MEAN PLATELET VOLUME (test code = 10.30 fL     7.0-9.6      H      

      



             MPV)                                                

 

             NEUTROPHIL % (test code = NT%) 67.6 %       40-76                  

   

 

             LYMPHOCYTE % (test code = LY%) 21.1 %       20.5-51.1    N         

   

 

             MONOCYTE % (test code = MO%) 8.0 %        1.7-9.3      N           

 

 

             EOSINOPHIL % (test code = EO%) 2.7 %        0.0-6.0      N         

   

 

             BASOPHIL % (test code = BA%) 0.6 %        0.0-2.0      N           

 

 

             NEUTROPHIL # (test code = NT#) 5.59 K/mm3   1.8-7.6      N         

   

 

             LYMPHOCYTE # (test code = LY#) 1.7 K/mm3    0.6-3.0      N         

   

 

             MONOCYTE # (test code = MO#) 0.7 K/mm3    0.2-1.5      N           

 

 

             EOSINOPHIL # (test code = EO#) 0.2 K/mm3    0.0-0.4      N         

   

 

             BASOPHIL # (test code = BA#) 0.1 K/mm3    0.0-0.2      N           

 

 

             MANUAL DIFF REQUIRED (test code =  DIFF/SCN    CRITERIA            

      



             MDIFF)                                              



- XR CHEST 1 -29-49 23:12:00 Name: DAYDAY PETER [place] : 
1965 Age/S: 54 / M [id number] Unit #: [id number] Loc: [place]
 [address] Phys: Clark Tipton MD  Acct: [id number] Dis Date: Status: ADM IN 
PHONE #: [phone] Exam Date: 2019 2300 FAX #: Reason: FOR SURGERY IN 
AM. EXAMS: CPT: [id number] XR CHEST 1 V 45344 Fluoro Time: DAP (Gy m2): Air Kerma
 (mGy): HISTORY: Chest pain. Location: C3 COMPARISON:2012 FINDINGS: 
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 There is mild cardiomegaly. No 
vascular congestion. The lungs are clear of focal consolidation. No effusion, 
pneumothorax, oracute osseous abnormality. IMPRESSION: 1. Heart size is upper 
normal. No focal consolidation. ** Electronically Signed by TIMOTEO Reyes 
** ** on 2019 at 2312 ** Reported and signed by: Jaison Reyes M.D. CC: 
Clark Tipton MD PAGE 1 Signed Report  Name: DAYDAY PETER 
[place] : 1965 Age/S: 54 / M [address] [address] Unit #: [id number] 
Loc:  [place] [address] Phys: Clark Tipton MD Acct: [id number] Dis Date: 
Status: ADM IN  PHONE #: [phone] Exam Date: 2019 2300 FAX #: Reason: 
FOR SURGERY IN AM. EXAMS:  CPT: [id number] XR CHEST 1 V 23327 Fluoro Time:DAP (Gy
 m2): Air Kerma (mGy): (Continued) Technologist: Kelli Beck RT(R)(CT) 
Trnscb Date/Time:2019 () t.SDR.RXC2 Orig Print D/T: S: 2019 
()  PAGE 2 Signed Report- CT ABD PELVIS [id number] 19:50:00 Name: 
DAYDAY PETER [place] : 1965 Age/S: 54 / M [address] [address] Unit #: [id number] Loc: [place] [address] Phys: Clark Tipton MD Acct:
 [id number] Dis Date: Status: ADM IN PHONE #: [phone] Exam Date: 
2019 1928 FAX #: Reason: Abd pain  EXAMS: CPT: [id number] CT ABD PELVIS 
W/CONT 33952 CT ABDOMEN AND PELVIS WITH IV CONTRAST HISTORY: Abd pain 
COMPARISON: MRI abdomen 12. TECHNIQUE: Axial CT images of the abdomen and 
pelvis were obtained  with coronal and/or sagittal reformatted views. Automated 
exposure control, iterative reconstruction technique, and/or adjustment of mA 
and/or kV according to patient's size was utilized for radiation dose reduction.
 IV CONTRAST: 100 ml Isovue-300. PO CONTRAST: None. Location: B2. FINDINGS: Mild
 atelectasis present in bothlung bases. The heart size is normal. Coronary 
artery calcifications. Epicardial pacemaker leads. Sternotomy wires. The 
gallbladder is distended. There are few small gallstones in the neck of the 
gallbladder. There may be a small amount of pericholecystic fluid and minimal 
inflammatory changes. Mild diffuse low attenuation seen throughout the liver 
suggestive of cyst steatosis. No focal hepatic lesion. Spleen, pancreas and 
adrenal glands are unremarkable. Both kidneys are similar in size, shape and e
nhancement without evidence of hydronephrosis. There are at least 3 
nonobstructing right renal stones measuring up to 3 mm. No definite left renal 
stone. Urinary bladder is partially distended without wall thickening. The 
prostate is normal in size. No free air, free fluid or evidence of a bowel 
obstruction. Normal appendix. No bowel wall thickening. The aorta is normal in 
caliber with mild to moderate atherosclerotic disease. No abdominal or pelvic 
adenopathy. Mild lumbar spondylosis.  IMPRESSION: PAGE 1 Signed Report 
(CONTINUED) Name: DAYDAY PETER [facility]  : 1965 Age/S: 54 /
M [id number] [address] Unit #: [id number] Loc: [place] [address] Phys: 
Clark Tipton MD  Acct: [id number] Dis Date: Status: ADM IN PHONE #: 
[phone] Exam Date: 2019 FAX #:  Reason: Abd pain EXAMS: CPT: 
[id number] CT ABD PELVIS W/CONT  98299 (Continued) Cholelithiasis. Minimal 
inflammatory changes and probable pericholecystic fluid concerning for acute 
cholecystitis. A right upper quadrant ultrasound may be helpful for further 
assessment. Normal appendix. Multiple nonobstructing right renal stones. ** 
Electronically Signed by TIMOTEO Angelo ** ** on 2019 at 1950 ** 
Reported and signed by: Darshan Angelo M.D.  CC: Clark Tipton MD 
Technologist:Edmundo Nava RT(R)(CT); .. CTDI: DLP: Trnscb Date/Time: 
2019 () t.[email or web address].SP17 Orig Print D/T: S: 2019 () PAGE 2 Signed 
ReportLACTIC [id number] 18:23:00





             Test Item    Value        Reference Range Interpretation Comments

 

             LACTIC ACID (test code = LACT) 1.4 mmol/L   0.4-2.0      N         

   



COMPREHENSIVE METABOLIC [id number] 18:09:00





             Test Item    Value        Reference Range Interpretation Comments

 

             SODIUM (test code = NA) 141 mmol/L   134-147      N            

 

             POTASSIUM (test code = 3.9 mmol/L   3.4-5.0      N            



             K)                                                  

 

             CHLORIDE (test code = 110 mmol/L   100-108      H            



             CL)                                                 

 

             CARBON DIOXIDE (test 26 mmol/L    21-32        N            



             code = CO2)                                         

 

             ANION GAP (test code = 5.0 GAP calc 4.0-15.0     N            



             GAP)                                                

 

             GLUCOSE (test code = 116 MG/DL           H            



             GLU)                                                

 

             BLOOD UREA NITROGEN 13 MG/DL     7-18         N            



             (test code = BUN)                                        

 

             GLOMERULAR FILTRATION >=60 max estimate >60                       



             RATE (test code = GFR) estGFR                                 

 

             CREATININE (test code = 1.3 MG/DL    0.8-1.3      N            



             CREAT)                                              

 

             TOTAL PROTEIN (test code 7.4 G/DL     6.4-8.2      N            



             = PROT)                                             

 

             ALBUMIN (test code = 3.6 G/DL     3.4-5.0      N            



             ALB)                                                

 

             GLOBULIN (test code = 3.8 GM/dL                              



             GLOB)                                               

 

             ALBUMIN/GLOBULIN RATIO 1.0 RATIO    1.2-2.2      L            



             (test code = A/G)                                        

 

             CALCIUM (test code = CA) 8.4 MG/DL    8.5-10.1     L            

 

             BILIRUBIN TOTAL (test 1.70 MG/DL   0.2-1.2      H            



             code = BILT)                                        

 

             SGOT/AST (test code = 130 Unit/L   15-37        H            



             AST)                                                

 

             SGPT/ALT (test code = 102 Unit/L   12-78        H            



             ALT)                                                

 

             ALKALINE PHOSPHATASE 76 Unit/L           N            



             TOTAL (test code = ALKP)                                        



LIPID PROFILE (CORONARY RISK)2019 18:09:00





             Test Item    Value        Reference Range Interpretation Comments

 

             TRIGLYCERIDES (test code = TRIG) 93 MG/DL     0-150        N       

     

 

             CHOLESTEROL (test code = CHOL) 140 MG/DL    133-200      N         

   

 

             CHOLESTEROL/HDL RATIO (test code = 2.50 RATIO   >0                 

       



             CHOLHDL)                                            

 

             HDL CHOLESTEROL (test code = HDL) 56 MG/DL     40-59        N      

      

 

             NON-HDL CHOLESTEROL (test code = 84 mg/dL     <130                 

     



             NHDL)                                               

 

             LIPOPROTEIN LDL (test code = LDL) 69 MG/DL     0-129        N      

      

 

             LDL/HDL (test code = LDL/HDL) 1.23 Ratio   1.48-3.22 Avg L         

   



[id number] 18:09:00





             Test Item    Value        Reference Range Interpretation Comments

 

             PHOSPHOROUS (test code = PHOS) 2.5 MG/DL    2.5-4.9      N         

   



RULE OUT MI [id number] 18:09:00





             Test Item    Value        Reference Range Interpretation Comments

 

             CREATINE KINASE 179 Unit/L          N            



             (CK) (test code =                                        



             CK)                                                 

 

             TROPONIN-I (test < 0.015 NG/ML 0.000-0.045  N            Negative: 

</= 0.045



             code = TROPI)                                        Positive: >/= 

0.046



                                                                 Correlation wit

h



                                                                 serial results,

 other



                                                                 cardiac markers

, and



                                                                 clinical findin

gs is



                                                                 necessary to de

termine



                                                                 the clinical



                                                                 significance of

 this



                                                                 result. Quantit

ative



                                                                 results using



                                                                 different



                                                                 methodologies s

hould



                                                                 not be compared

 to one



                                                                 another as nume

rical



                                                                 results may promise

yby



                                                                 method.



[id number] 18:09:00





             Test Item    Value        Reference Range Interpretation Comments

 

             LIPASE (test code = LIP) 457 Unit/L   114-286      H            



[id number] 18:09:00





             Test Item    Value        Reference Range Interpretation Comments

 

             MAGNESIUM (test code = MAG) 2.0 MG/DL    1.8-2.4      N            



COMPREHENSIVE METABOLIC [id number] 18:05:00





             Test Item    Value        Reference Range Interpretation Comments

 

             SODIUM (test code = NA) 141 mmol/L   134-147      N            

 

             POTASSIUM (test code = K) 3.9 mmol/L   3.4-5.0      N            

 

             CHLORIDE (test code = CL) 110 mmol/L   100-108      H            

 

             CARBON DIOXIDE (test code = CO2) 26 mmol/L    21-32        N       

     

 

             ANION GAP (test code = GAP) 5.0 GAP calc 4.0-15.0     N            

 

             GLUCOSE (test code = GLU) 116 MG/DL           H            

 

             BLOOD UREA NITROGEN (test code = 13 MG/DL     7-18         N       

     



             BUN)                                                

 

             GLOMERULAR FILTRATION RATE (test  estGFR      >60                  

     



             code = GFR)                                         

 

             CREATININE (test code = CREAT)  MG/DL       0.8-1.3                

   

 

             TOTAL PROTEIN (test code = PROT)  G/DL        6.4-8.2              

     

 

             ALBUMIN (test code = ALB)  G/DL        3.4-5.0                   

 

             GLOBULIN (test code = GLOB)  GM/dL                                 

 

             ALBUMIN/GLOBULIN RATIO (test  RATIO       1.2-2.2                  

 



             code = A/G)                                         

 

             CALCIUM (test code = CA) 8.4 MG/DL    8.5-10.1     L            

 

             BILIRUBIN TOTAL (test code =  MG/DL       0.2-1.2                  

 



             BILT)                                               

 

             SGOT/AST (test code = AST)  Unit/L      15-37                     

 

             SGPT/ALT (test code = ALT)  Unit/L      12-78                     

 

             ALKALINE PHOSPHATASE TOTAL (test  Unit/L                     

     



             code = ALKP)                                        



LIPID PROFILE (CORONARY RISK)2019 18:05:00





             Test Item    Value        Reference Range Interpretation Comments

 

             TRIGLYCERIDES (test code = TRIG)  MG/DL       0-150                

     

 

             CHOLESTEROL (test code = CHOL)  MG/DL       133-200                

   

 

             CHOLESTEROL/HDL RATIO (test code =  RATIO       >0                 

       



             CHOLHDL)                                            

 

             HDL CHOLESTEROL (test code = HDL)  MG/DL       40-59               

      

 

             NON-HDL CHOLESTEROL (test code = NHDL)  mg/dL       <130           

           

 

             LIPOPROTEIN LDL (test code = LDL)  MG/DL       0-129               

      

 

             LDL/HDL (test code = LDL/HDL)  Ratio       1.48-3.22 Avg           

   



[id number] 18:05:00





             Test Item    Value        Reference Range Interpretation Comments

 

             PHOSPHOROUS (test code = PHOS)  MG/DL       2.5-4.9                

   



RULE OUT MI [id number] 18:05:00





             Test Item    Value        Reference Range Interpretation Comments

 

             CREATINE KINASE (CK) (test code = CK)  Unit/L                

          

 

             TROPONIN-I (test code = TROPI)  NG/ML       0.000-0.045            

   



[id number] 18:05:00





             Test Item    Value        Reference Range Interpretation Comments

 

             LIPASE (test code = LIP)  Unit/L      114-286                   



[id number] 18:05:00





             Test Item    Value        Reference Range Interpretation Comments

 

             MAGNESIUM (test code = MAG)  MG/DL       1.8-2.4                   



PROTHROMBIN [id number] 17:54:00





             Test Item    Value        Reference Range Interpretation Comments

 

             PT PATIENT (test code = PTP) 11.2 SECONDS 9.3-12.9     N           

 

 

             INTERNATIONAL NORMAL RATIO 0.97 INR Unit 0.8-1.2      N            



             (test code = INR)                                        



CBC W/AUTO [id number] 17:48:00





             Test Item    Value        Reference Range Interpretation Comments

 

             WHITE BLOOD CELL (test code = 9.0 K/mm3    3.5-11.0     N          

  



             WBC)                                                

 

             RED BLOOD CELL (test code = RBC) 5.10 M/mm3   4.70-6.10    N       

     

 

             HEMOGLOBIN (test code = HGB) 14.5 G/DL    12.3-15.9    N           

 

 

             HEMATOCRIT (test code = HCT) 43.4 %       35.8-46.7    N           

 

 

             MEAN CELL VOLUME (test code = 85.1 Fl      86.3-98.9    L          

  



             MCV)                                                

 

             MEAN CELL HGB (test code = MCH) 28.4 pg      28.9-34.4    L        

    

 

             MEAN CELL HGB CONCETRATION (test 33.4 G/DL    32.1-34.5    N       

     



             code = MCHC)                                        

 

             RED CELL DISTRIBUTION WIDTH (test 13.0 SD      11.5-14.5    N      

      



             code = RDW)                                         

 

             PLATELET COUNT (test code = PLT) 274.0 K/mm3  150-450      N       

     

 

             MEAN PLATELET VOLUME (test code = 10.50 fL     7.0-9.6      H      

      



             MPV)                                                

 

             NEUTROPHIL % (test code = NT%) 75.7 %       40-76        N         

   

 

             LYMPHOCYTE % (test code = LY%) 16.5 %       20.5-51.1    L         

   

 

             MONOCYTE % (test code = MO%) 6.9 %        1.7-9.3      N           

 

 

             EOSINOPHIL % (test code = EO%) 0.6 %        0.0-6.0      N         

   

 

             BASOPHIL % (test code = BA%) 0.3 %        0.0-2.0      N           

 

 

             NEUTROPHIL # (test code = NT#) 6.80 K/mm3   1.8-7.6      N         

   

 

             LYMPHOCYTE # (test code = LY#) 1.5 K/mm3    0.6-3.0      N         

   

 

             MONOCYTE # (test code = MO#) 0.6 K/mm3    0.2-1.5      N           

 

 

             EOSINOPHIL # (test code = EO#) 0.1 K/mm3    0.0-0.4      N         

   

 

             BASOPHIL # (test code = BA#) 0.0 K/mm3    0.0-0.2      N           

 

 

             MANUAL DIFF REQUIRED (test code = NO DIFF/SCN  CRITERIA            

      



             MDIFF)

## 2023-02-12 NOTE — RAD REPORT
EXAM DESCRIPTION:  CT - Abdomen   Pelvis Wo Contrast - 2/12/2023 3:18 pm

 

CLINICAL HISTORY:  Abdominal pain.

abdmominal distention

 

COMPARISON:  [facility] dated 8/24/2022

 

TECHNIQUE:  CT imaging of the abdomen and pelvis was performed without contrast. Solid organ, bowel a
nd vascular assessment is limited due to lack of IV and oral contrast.

 

All CT scans are performed using dose optimization technique as appropriate and may include automated
 exposure control or mA/KV adjustment according to patient size.

 

FINDINGS:  Subsegmental atelectasis is seen in the left lung base. Small bilateral pleural effusions.


 

Cholecystectomy.No pathologic biliary dilatation seen.

 

The liver, spleen, pancreas adrenal glands are normal. Small stones are present both kidneys without 
hydronephrosis.

 

Mild free fluid in the abdomen and pelvis. No bowel obstruction or free air.  The appendix is normal.


 

The osseous structures are within normal limits.

 

IMPRESSION:  Bilateral nephrolithiasis is noted without hydronephrosis.

 

Mild free fluid in the abdomen and small bilateral pleural effusions.

 

A limited non-contrast examination was performed as detailed.

## 2023-02-14 NOTE — EKG
Test Date:    2023-02-12               Test Time:    14:54:04

Technician:   PH                                     

                                                     

MEASUREMENT RESULTS:                                       

Intervals:                                           

Rate:         96                                     

VA:           210                                    

QRSD:         126                                    

QT:           364                                    

QTc:          459                                    

Axis:                                                

P:            67                                     

VA:           210                                    

QRS:          -71                                    

T:            81                                     

                                                     

INTERPRETIVE STATEMENTS:                                       

                                                     

Sinus rhythm with 1st degree AV block

Possible Left atrial enlargement

Left axis deviation

Nonspecific intraventricular block

Cannot rule out Anterior infarct, age undetermined

Abnormal ECG

Compared to ECG 09/17/2022 19:57:15

First degree AV block now present

Myocardial infarct finding now present

T-wave abnormality no longer present



Electronically Signed On 02-14-23 17:13:06 CST by Dylan Ann